# Patient Record
Sex: MALE | Race: WHITE | Employment: OTHER | URBAN - METROPOLITAN AREA
[De-identification: names, ages, dates, MRNs, and addresses within clinical notes are randomized per-mention and may not be internally consistent; named-entity substitution may affect disease eponyms.]

---

## 2024-05-01 ENCOUNTER — HOSPITAL ENCOUNTER (OUTPATIENT)
Age: 74
Setting detail: SPECIMEN
Discharge: HOME | End: 2024-05-01

## 2024-05-02 PROCEDURE — 87086 URINE CULTURE/COLONY COUNT: CPT

## 2024-05-03 ENCOUNTER — HOSPITAL ENCOUNTER (OUTPATIENT)
Age: 74
Setting detail: SPECIMEN
Discharge: HOME | End: 2024-05-03

## 2024-05-03 LAB
ALBUMIN SERPL-MCNC: 2.7 G/DL (ref 3.5–5.2)
ALP SERPL-CCNC: 93 U/L (ref 40–129)
ALT SERPL-CCNC: 15 U/L (ref 5–41)
ANION GAP SERPL CALCULATED.3IONS-SCNC: 9 MMOL/L (ref 9–17)
AST SERPL-CCNC: 16 U/L
BILIRUB DIRECT SERPL-MCNC: 0.4 MG/DL
BILIRUB INDIRECT SERPL-MCNC: 0.8 MG/DL (ref 0–1)
BILIRUB SERPL-MCNC: 1.2 MG/DL (ref 0.3–1.2)
BUN SERPL-MCNC: 10 MG/DL (ref 8–23)
BUN/CREAT SERPL: 17 (ref 9–20)
CALCIUM SERPL-MCNC: 8.6 MG/DL (ref 8.6–10.4)
CHLORIDE SERPL-SCNC: 101 MMOL/L (ref 98–107)
CO2 SERPL-SCNC: 27 MMOL/L (ref 20–31)
CREAT SERPL-MCNC: 0.6 MG/DL (ref 0.7–1.2)
GFR, ESTIMATED: >90 ML/MIN/1.73M2
GLUCOSE SERPL-MCNC: 99 MG/DL (ref 70–99)
MICROORGANISM SPEC CULT: ABNORMAL
PLATELET # BLD AUTO: 214 K/UL (ref 138–453)
POTASSIUM SERPL-SCNC: 3.9 MMOL/L (ref 3.7–5.3)
PROT SERPL-MCNC: 6 G/DL (ref 6.4–8.3)
SERVICE CMNT-IMP: ABNORMAL
SODIUM SERPL-SCNC: 137 MMOL/L (ref 135–144)
SPECIMEN DESCRIPTION: ABNORMAL
WBC OTHER # BLD: 10.6 K/UL (ref 3.5–11.3)

## 2024-05-03 PROCEDURE — P9603 ONE-WAY ALLOW PRORATED MILES: HCPCS

## 2024-05-03 PROCEDURE — 36415 COLL VENOUS BLD VENIPUNCTURE: CPT

## 2024-05-03 PROCEDURE — 80076 HEPATIC FUNCTION PANEL: CPT

## 2024-05-03 PROCEDURE — 85048 AUTOMATED LEUKOCYTE COUNT: CPT

## 2024-05-03 PROCEDURE — 80048 BASIC METABOLIC PNL TOTAL CA: CPT

## 2024-05-03 PROCEDURE — 85049 AUTOMATED PLATELET COUNT: CPT

## 2024-05-04 ENCOUNTER — HOSPITAL ENCOUNTER (INPATIENT)
Age: 74
LOS: 4 days | Discharge: HOME HEALTH | DRG: 291 | End: 2024-05-08
Attending: EMERGENCY MEDICINE | Admitting: HOSPITALIST
Payer: MEDICARE

## 2024-05-04 ENCOUNTER — APPOINTMENT (OUTPATIENT)
Dept: GENERAL RADIOLOGY | Facility: CLINIC | Age: 74
DRG: 291 | End: 2024-05-04
Attending: EMERGENCY MEDICINE
Payer: MEDICARE

## 2024-05-04 DIAGNOSIS — I47.29 NONSUSTAINED VENTRICULAR TACHYCARDIA (HCC): Primary | ICD-10-CM

## 2024-05-04 DIAGNOSIS — I50.9 CONGESTIVE HEART FAILURE, UNSPECIFIED HF CHRONICITY, UNSPECIFIED HEART FAILURE TYPE (HCC): ICD-10-CM

## 2024-05-04 DIAGNOSIS — R06.02 SHORTNESS OF BREATH: ICD-10-CM

## 2024-05-04 DIAGNOSIS — R09.02 HYPOXIA: ICD-10-CM

## 2024-05-04 PROBLEM — I47.20 VT (VENTRICULAR TACHYCARDIA) (HCC): Status: ACTIVE | Noted: 2024-05-04

## 2024-05-04 PROBLEM — I47.20 VENTRICULAR TACHYARRHYTHMIA (HCC): Status: ACTIVE | Noted: 2024-05-04

## 2024-05-04 LAB
ALBUMIN SERPL-MCNC: 3.2 G/DL (ref 3.5–5.2)
ALP SERPL-CCNC: 98 U/L (ref 40–129)
ALT SERPL-CCNC: 15 U/L (ref 5–41)
AST SERPL-CCNC: 17 U/L
BASOPHILS # BLD: 0 K/UL (ref 0–0.2)
BASOPHILS NFR BLD: 0 % (ref 0–2)
BILIRUB DIRECT SERPL-MCNC: 0.5 MG/DL
BILIRUB INDIRECT SERPL-MCNC: 1.1 MG/DL (ref 0–1)
BILIRUB SERPL-MCNC: 1.6 MG/DL (ref 0.3–1.2)
BNP SERPL-MCNC: 1843 PG/ML
BUN BLD-MCNC: 11 MG/DL (ref 8–26)
CA-I BLD-SCNC: 1.13 MMOL/L (ref 1.15–1.33)
CHLORIDE BLD-SCNC: 101 MMOL/L (ref 98–107)
CO2 BLD CALC-SCNC: 25 MMOL/L (ref 22–30)
CRITICAL ACTION: NORMAL
CRITICAL NOTIFICATION DATE/TIME: NORMAL
CRITICAL NOTIFICATION: NORMAL
CRITICAL VALUE READ BACK: YES
EGFR, POC: >90 ML/MIN/1.73M2
EOSINOPHIL # BLD: 0.12 K/UL (ref 0–0.4)
EOSINOPHILS RELATIVE PERCENT: 1 % (ref 1–4)
ERYTHROCYTE [DISTWIDTH] IN BLOOD BY AUTOMATED COUNT: 16.9 % (ref 12.5–15.4)
FIO2: 3.5
GLUCOSE BLD-MCNC: 126 MG/DL (ref 74–100)
HCO3 VENOUS: 27.4 MMOL/L (ref 22–29)
HCT VFR BLD AUTO: 41.7 % (ref 41–53)
HGB BLD-MCNC: 14 G/DL (ref 13.5–17.5)
LACTATE BLDV-SCNC: 1.3 MMOL/L (ref 0.5–2.2)
LYMPHOCYTES NFR BLD: 0.6 K/UL (ref 1–4.8)
LYMPHOCYTES RELATIVE PERCENT: 5 % (ref 24–44)
MAGNESIUM SERPL-MCNC: 1.7 MG/DL (ref 1.6–2.6)
MCH RBC QN AUTO: 29.3 PG (ref 26–34)
MCHC RBC AUTO-ENTMCNC: 33.7 G/DL (ref 31–37)
MCV RBC AUTO: 86.9 FL (ref 80–100)
MONOCYTES NFR BLD: 0.36 K/UL (ref 0.1–0.8)
MONOCYTES NFR BLD: 3 % (ref 1–7)
MORPHOLOGY: ABNORMAL
NEUTROPHILS NFR BLD: 91 % (ref 36–66)
NEUTS SEG NFR BLD: 10.82 K/UL (ref 1.8–7.7)
O2 DELIVERY DEVICE: ABNORMAL
O2 SAT, VEN: 54.7 % (ref 60–85)
PATIENT TEMP: 37
PCO2, VEN: 43.1 MM HG (ref 41–51)
PH VENOUS: 7.41 (ref 7.32–7.43)
PLATELET # BLD AUTO: 247 K/UL (ref 140–450)
PMV BLD AUTO: 7.4 FL (ref 6–12)
PO2, VEN: 28.7 MM HG (ref 30–50)
POC ANION GAP: 10 MMOL/L (ref 7–16)
POC CREATININE: 0.6 MG/DL (ref 0.51–1.19)
POC HCO3: 26.8 MMOL/L (ref 21–28)
POC LACTIC ACID: 1.1 MMOL/L (ref 0.56–1.39)
POC O2 SATURATION: 92.3 % (ref 94–98)
POC PCO2: 38.7 MM HG (ref 35–48)
POC PH: 7.45 (ref 7.35–7.45)
POC PO2: 61.8 MM HG (ref 83–108)
POSITIVE BASE EXCESS, ART: 2.6 MMOL/L (ref 0–3)
POSITIVE BASE EXCESS, VEN: 2.2 MMOL/L (ref 0–3)
POTASSIUM BLD-SCNC: 4.6 MMOL/L (ref 3.5–4.5)
PROT SERPL-MCNC: 6.6 G/DL (ref 6.4–8.3)
RBC # BLD AUTO: 4.79 M/UL (ref 4.5–5.9)
SODIUM BLD-SCNC: 135 MMOL/L (ref 138–146)
TROPONIN I SERPL HS-MCNC: 60 NG/L (ref 0–22)
TROPONIN I SERPL HS-MCNC: 67 NG/L (ref 0–22)
TROPONIN I SERPL HS-MCNC: 67 NG/L (ref 0–22)
WBC OTHER # BLD: 11.9 K/UL (ref 3.5–11)

## 2024-05-04 PROCEDURE — 82330 ASSAY OF CALCIUM: CPT

## 2024-05-04 PROCEDURE — 82947 ASSAY GLUCOSE BLOOD QUANT: CPT

## 2024-05-04 PROCEDURE — 2580000003 HC RX 258: Performed by: INTERNAL MEDICINE

## 2024-05-04 PROCEDURE — 94640 AIRWAY INHALATION TREATMENT: CPT

## 2024-05-04 PROCEDURE — 6370000000 HC RX 637 (ALT 250 FOR IP): Performed by: HOSPITALIST

## 2024-05-04 PROCEDURE — 96366 THER/PROPH/DIAG IV INF ADDON: CPT

## 2024-05-04 PROCEDURE — 36415 COLL VENOUS BLD VENIPUNCTURE: CPT

## 2024-05-04 PROCEDURE — 83880 ASSAY OF NATRIURETIC PEPTIDE: CPT

## 2024-05-04 PROCEDURE — 82565 ASSAY OF CREATININE: CPT

## 2024-05-04 PROCEDURE — 6360000002 HC RX W HCPCS: Performed by: EMERGENCY MEDICINE

## 2024-05-04 PROCEDURE — 6360000002 HC RX W HCPCS: Performed by: HOSPITALIST

## 2024-05-04 PROCEDURE — 85025 COMPLETE CBC W/AUTO DIFF WBC: CPT

## 2024-05-04 PROCEDURE — 96365 THER/PROPH/DIAG IV INF INIT: CPT

## 2024-05-04 PROCEDURE — 0202U NFCT DS 22 TRGT SARS-COV-2: CPT

## 2024-05-04 PROCEDURE — 2580000003 HC RX 258: Performed by: HOSPITALIST

## 2024-05-04 PROCEDURE — 71045 X-RAY EXAM CHEST 1 VIEW: CPT

## 2024-05-04 PROCEDURE — 80051 ELECTROLYTE PANEL: CPT

## 2024-05-04 PROCEDURE — 87040 BLOOD CULTURE FOR BACTERIA: CPT

## 2024-05-04 PROCEDURE — 84520 ASSAY OF UREA NITROGEN: CPT

## 2024-05-04 PROCEDURE — 80076 HEPATIC FUNCTION PANEL: CPT

## 2024-05-04 PROCEDURE — 84484 ASSAY OF TROPONIN QUANT: CPT

## 2024-05-04 PROCEDURE — 83605 ASSAY OF LACTIC ACID: CPT

## 2024-05-04 PROCEDURE — 96374 THER/PROPH/DIAG INJ IV PUSH: CPT

## 2024-05-04 PROCEDURE — 93005 ELECTROCARDIOGRAM TRACING: CPT | Performed by: EMERGENCY MEDICINE

## 2024-05-04 PROCEDURE — 2580000003 HC RX 258: Performed by: EMERGENCY MEDICINE

## 2024-05-04 PROCEDURE — 99285 EMERGENCY DEPT VISIT HI MDM: CPT

## 2024-05-04 PROCEDURE — 82803 BLOOD GASES ANY COMBINATION: CPT

## 2024-05-04 PROCEDURE — 94761 N-INVAS EAR/PLS OXIMETRY MLT: CPT

## 2024-05-04 PROCEDURE — 2700000000 HC OXYGEN THERAPY PER DAY

## 2024-05-04 PROCEDURE — 6360000002 HC RX W HCPCS: Performed by: INTERNAL MEDICINE

## 2024-05-04 PROCEDURE — 2000000000 HC ICU R&B

## 2024-05-04 PROCEDURE — 83735 ASSAY OF MAGNESIUM: CPT

## 2024-05-04 PROCEDURE — 36600 WITHDRAWAL OF ARTERIAL BLOOD: CPT

## 2024-05-04 RX ORDER — AMIODARONE HYDROCHLORIDE 150 MG/3ML
INJECTION, SOLUTION INTRAVENOUS
Status: DISPENSED
Start: 2024-05-04 | End: 2024-05-05

## 2024-05-04 RX ORDER — ONDANSETRON 2 MG/ML
4 INJECTION INTRAMUSCULAR; INTRAVENOUS EVERY 6 HOURS PRN
Status: DISCONTINUED | OUTPATIENT
Start: 2024-05-04 | End: 2024-05-08 | Stop reason: HOSPADM

## 2024-05-04 RX ORDER — SODIUM CHLORIDE FOR INHALATION 0.9 %
3 VIAL, NEBULIZER (ML) INHALATION EVERY 8 HOURS PRN
Status: DISCONTINUED | OUTPATIENT
Start: 2024-05-04 | End: 2024-05-06

## 2024-05-04 RX ORDER — FUROSEMIDE 10 MG/ML
40 INJECTION INTRAMUSCULAR; INTRAVENOUS DAILY
Status: DISCONTINUED | OUTPATIENT
Start: 2024-05-04 | End: 2024-05-08 | Stop reason: HOSPADM

## 2024-05-04 RX ORDER — NITROGLYCERIN 0.4 MG/1
0.4 TABLET SUBLINGUAL EVERY 5 MIN PRN
COMMUNITY

## 2024-05-04 RX ORDER — ALBUTEROL SULFATE 2.5 MG/3ML
2.5 SOLUTION RESPIRATORY (INHALATION)
Status: DISCONTINUED | OUTPATIENT
Start: 2024-05-04 | End: 2024-05-04

## 2024-05-04 RX ORDER — DOCUSATE SODIUM 100 MG/1
100 CAPSULE, LIQUID FILLED ORAL DAILY
COMMUNITY

## 2024-05-04 RX ORDER — TIOTROPIUM BROMIDE 18 UG/1
18 CAPSULE ORAL; RESPIRATORY (INHALATION) DAILY
COMMUNITY

## 2024-05-04 RX ORDER — SODIUM CHLORIDE 9 MG/ML
INJECTION, SOLUTION INTRAVENOUS PRN
Status: DISCONTINUED | OUTPATIENT
Start: 2024-05-04 | End: 2024-05-08 | Stop reason: HOSPADM

## 2024-05-04 RX ORDER — CLOPIDOGREL BISULFATE 75 MG/1
75 TABLET ORAL DAILY
COMMUNITY

## 2024-05-04 RX ORDER — ONDANSETRON 4 MG/1
4 TABLET, ORALLY DISINTEGRATING ORAL EVERY 8 HOURS PRN
Status: DISCONTINUED | OUTPATIENT
Start: 2024-05-04 | End: 2024-05-08 | Stop reason: HOSPADM

## 2024-05-04 RX ORDER — ACETAMINOPHEN 650 MG/1
650 SUPPOSITORY RECTAL EVERY 6 HOURS PRN
Status: DISCONTINUED | OUTPATIENT
Start: 2024-05-04 | End: 2024-05-08 | Stop reason: HOSPADM

## 2024-05-04 RX ORDER — MAGNESIUM SULFATE 1 G/100ML
1000 INJECTION INTRAVENOUS PRN
Status: DISCONTINUED | OUTPATIENT
Start: 2024-05-04 | End: 2024-05-08 | Stop reason: HOSPADM

## 2024-05-04 RX ORDER — ACETAMINOPHEN 325 MG/1
650 TABLET ORAL EVERY 6 HOURS PRN
Status: DISCONTINUED | OUTPATIENT
Start: 2024-05-04 | End: 2024-05-08 | Stop reason: HOSPADM

## 2024-05-04 RX ORDER — IPRATROPIUM BROMIDE AND ALBUTEROL SULFATE 2.5; .5 MG/3ML; MG/3ML
1 SOLUTION RESPIRATORY (INHALATION)
Status: DISCONTINUED | OUTPATIENT
Start: 2024-05-04 | End: 2024-05-04

## 2024-05-04 RX ORDER — POTASSIUM CHLORIDE 7.45 MG/ML
10 INJECTION INTRAVENOUS PRN
Status: DISCONTINUED | OUTPATIENT
Start: 2024-05-04 | End: 2024-05-08 | Stop reason: HOSPADM

## 2024-05-04 RX ORDER — POTASSIUM CHLORIDE 20 MEQ/1
40 TABLET, EXTENDED RELEASE ORAL PRN
Status: DISCONTINUED | OUTPATIENT
Start: 2024-05-04 | End: 2024-05-08 | Stop reason: HOSPADM

## 2024-05-04 RX ORDER — BISACODYL 10 MG
10 SUPPOSITORY, RECTAL RECTAL DAILY PRN
COMMUNITY

## 2024-05-04 RX ORDER — SODIUM CHLORIDE 0.9 % (FLUSH) 0.9 %
10 SYRINGE (ML) INJECTION EVERY 12 HOURS SCHEDULED
Status: DISCONTINUED | OUTPATIENT
Start: 2024-05-04 | End: 2024-05-08 | Stop reason: HOSPADM

## 2024-05-04 RX ORDER — SODIUM CHLORIDE 0.9 % (FLUSH) 0.9 %
10 SYRINGE (ML) INJECTION PRN
Status: DISCONTINUED | OUTPATIENT
Start: 2024-05-04 | End: 2024-05-08 | Stop reason: HOSPADM

## 2024-05-04 RX ORDER — LEVALBUTEROL 1.25 MG/.5ML
1.25 SOLUTION, CONCENTRATE RESPIRATORY (INHALATION) EVERY 8 HOURS PRN
Status: DISCONTINUED | OUTPATIENT
Start: 2024-05-04 | End: 2024-05-04

## 2024-05-04 RX ORDER — SENNOSIDES A AND B 8.6 MG/1
1 TABLET, FILM COATED ORAL 2 TIMES DAILY PRN
Status: DISCONTINUED | OUTPATIENT
Start: 2024-05-04 | End: 2024-05-08 | Stop reason: HOSPADM

## 2024-05-04 RX ORDER — CARVEDILOL 6.25 MG/1
6.25 TABLET ORAL 2 TIMES DAILY
Status: ON HOLD | COMMUNITY
End: 2024-05-08 | Stop reason: HOSPADM

## 2024-05-04 RX ORDER — LEVALBUTEROL 1.25 MG/.5ML
1.25 SOLUTION, CONCENTRATE RESPIRATORY (INHALATION) EVERY 4 HOURS PRN
Status: DISCONTINUED | OUTPATIENT
Start: 2024-05-04 | End: 2024-05-08 | Stop reason: HOSPADM

## 2024-05-04 RX ORDER — ALBUTEROL SULFATE 90 UG/1
2 AEROSOL, METERED RESPIRATORY (INHALATION) EVERY 6 HOURS PRN
Status: DISCONTINUED | OUTPATIENT
Start: 2024-05-04 | End: 2024-05-04

## 2024-05-04 RX ORDER — ALBUTEROL SULFATE 90 UG/1
2 AEROSOL, METERED RESPIRATORY (INHALATION) EVERY 6 HOURS PRN
COMMUNITY

## 2024-05-04 RX ADMIN — WATER 40 MG: 1 INJECTION INTRAMUSCULAR; INTRAVENOUS; SUBCUTANEOUS at 22:36

## 2024-05-04 RX ADMIN — IPRATROPIUM BROMIDE AND ALBUTEROL SULFATE 1 DOSE: .5; 2.5 SOLUTION RESPIRATORY (INHALATION) at 20:06

## 2024-05-04 RX ADMIN — WATER 1000 MG: 1 INJECTION INTRAMUSCULAR; INTRAVENOUS; SUBCUTANEOUS at 22:36

## 2024-05-04 RX ADMIN — AMIODARONE HYDROCHLORIDE 0.5 MG/MIN: 50 INJECTION, SOLUTION INTRAVENOUS at 16:20

## 2024-05-04 RX ADMIN — SODIUM CHLORIDE, PRESERVATIVE FREE 10 ML: 5 INJECTION INTRAVENOUS at 20:17

## 2024-05-04 RX ADMIN — DEXTROSE MONOHYDRATE 150 MG: 50 INJECTION, SOLUTION INTRAVENOUS at 15:48

## 2024-05-04 RX ADMIN — FUROSEMIDE 40 MG: 10 INJECTION, SOLUTION INTRAMUSCULAR; INTRAVENOUS at 20:16

## 2024-05-04 RX ADMIN — APIXABAN 5 MG: 5 TABLET, FILM COATED ORAL at 20:18

## 2024-05-04 NOTE — FLOWSHEET NOTE
Andres contacted via telephone and clinical lead requested paperwork to be faxed confirming patient is a DNR-CCA. Fax received and paperwork placed in chart. Writer to contact Dr. Small.

## 2024-05-04 NOTE — FLOWSHEET NOTE
Patient arrived to room 2029 via stretcher with [facility]. [facility] reports patient is a DNR-CCA; however we do not have paperwork stating this. RN notified clinical lead and house supervisor of this matter. Patient is alert and oriented and denies pain. Amiodarone gtt infusing at 0.5 mg/min. Patient is on 4 liters NC and denies O2 home use prior. Patient transferred to bed via 4 staff members. Bed locked and placed in lowest position. Side rails up x2. Call light placed at the patient's bedside. Bedside report completed. Vital signs obtained (see flowsheet). RN educated the patient on position on plan of care at this time. Patient verbalizes understanding. Will continue to monitor closely.

## 2024-05-04 NOTE — ED PROVIDER NOTES
[facility]  [address] [address]  Phone: [phone]  EMERGENCY DEPARTMENT ENCOUNTER      Pt Name: Aniceto Elam  MRN: [id number]  Birthdate 1950  Date of evaluation: 5/4/2024    CHIEF COMPLAINT       Chief Complaint   Patient presents with    Shortness of Breath     X 1 week. From [facility]. EMS arrived pt 88%/RA.        HISTORY OF PRESENT ILLNESS    Aniceto Elam is a 74 y.o. male who presents to the emergency department with a complaint of dyspnea.  Patient states symptoms have been ongoing for 3 weeks.  He states has been worse in the last week.  He came in via ambulance from [facility].  When EMS arrived his oxygen saturation was 88% on room air.  Patient has a history of pulmonary embolus and COPD.  He denies any chest pain.  He states he has a cough which is productive of yellow sputum.  He denies any fever, or chills.  Denies any lower extremity edema.  He has a history of atrial fibrillation and is on Eliquis.  Patient was recently discharged to [facility] from a [facility] facility for  Procedure Summary         Date: 04/13/24 Room / Location: [facility] OR 11 / TT SPECIAL PROC     Anesthesia Start: 1806 Anesthesia Stop: 2231     Procedure: ULTRASOUND GUIDED ACCESS OF LEFT COMMON FEMORAL ARTERY/ CUTDOWN OF RIGHT COMMON FEMORAL ARTERY/ ENDOVASCULAR ARTHRECTOMY OF RIGHT ILIAC ARTERY/ EVAR/ IVUS/ STENT PLACEMENT OF RIGHT ILIAC/ THROMBECTOMY/ RIGHT FEMORAL ENDARTERECTOMY WITH PATCH ANGIOPLASTY/ BALLOON ANGIOPLASTY/ ENDOANCHORS (Bilateral: Groin) Diagnosis: (SYMPTOMATIC ABCOMINAL AORTIC ANEURYSM)     Surgeons: Gulshan Roy MD      The patient states he has never been on oxygen.  He denies any abdominal pain he denies any nausea, vomiting, or diarrhea.  He denies any hematuria, dysuria.  He denies any lower extremity edema, or cramping.  He has not missed any doses of the Eliquis.  Patient is a poor historian.  He is a DNR CC Arrest.  Agreeable to cardioversion  REVIEW OF

## 2024-05-04 NOTE — FLOWSHEET NOTE
RN contacted Dr. Small via [employer] and informed of patient's arrival to 2029, as well as paperwork recieved from [facility] stating [facility] is a DNRCCA and need to place code status order, as well as need for diet order and home medications. Awaiting response at this time. Will continue to monitor closely.

## 2024-05-04 NOTE — RT PROTOCOL NOTE
RT Inhaler-Nebulizer Bronchodilator Protocol Note    There is a bronchodilator order in the chart from a provider indicating to follow the RT Bronchodilator Protocol and there is an “Initiate RT Inhaler-Nebulizer Bronchodilator Protocol” order as well (see protocol at bottom of note).    CXR Findings:  XR CHEST PORTABLE    Result Date: 5/4/2024  1. Hazy bibasilar opacities, more likely atelectatic, without consolidation. 2. Lucency left base laterally with sharp curvilinear border, possibly superimposed. No definite pneumothorax.  Suggest follow-up PA and lateral study.       The findings from the last RT Protocol Assessment were as follows:   History Pulmonary Disease: Chronic pulmonary disease  Respiratory Pattern: Mild dyspnea at rest, irregular pattern, or RR 21-25 bpm  Breath Sounds: Intermittent or unilateral wheezes  Cough: Strong, productive  Indication for Bronchodilator Therapy: Decreased or absent breath sounds, On home bronchodilators, Wheezing associated with pulm disorder  Bronchodilator Assessment Score: 11    Aerosolized bronchodilator medication orders have been revised according to the RT Inhaler-Nebulizer Bronchodilator Protocol below.    Respiratory Therapist to perform RT Therapy Protocol Assessment initially then follow the protocol.  Repeat RT Therapy Protocol Assessment PRN for score 0-3 or on second treatment, BID, and PRN for scores above 3.    No Indications - adjust the frequency to every 6 hours PRN wheezing or bronchospasm, if no treatments needed after 48 hours then discontinue using Per Protocol order mode.     If indication present, adjust the RT bronchodilator orders based on the Bronchodilator Assessment Score as indicated below.  Use Inhaler orders unless patient has one or more of the following: on home nebulizer, not able to hold breath for 10 seconds, is not alert and oriented, cannot activate and use MDI correctly, or respiratory rate 25 breaths per minute or more, then use 
inability or reluctance to perform ADLs/decreased activity level

## 2024-05-05 ENCOUNTER — APPOINTMENT (OUTPATIENT)
Dept: GENERAL RADIOLOGY | Age: 74
DRG: 291 | End: 2024-05-05
Payer: MEDICARE

## 2024-05-05 LAB
ANION GAP SERPL CALCULATED.3IONS-SCNC: 9 MMOL/L (ref 9–17)
B PARAP IS1001 DNA NPH QL NAA+NON-PROBE: NOT DETECTED
B PERT DNA SPEC QL NAA+PROBE: NOT DETECTED
BASOPHILS # BLD: 0 K/UL (ref 0–0.2)
BASOPHILS NFR BLD: 0 % (ref 0–2)
BNP SERPL-MCNC: 1766 PG/ML
BUN SERPL-MCNC: 15 MG/DL (ref 8–23)
BUN/CREAT SERPL: 21 (ref 9–20)
C PNEUM DNA NPH QL NAA+NON-PROBE: NOT DETECTED
CALCIUM SERPL-MCNC: 8.5 MG/DL (ref 8.6–10.4)
CHLORIDE SERPL-SCNC: 98 MMOL/L (ref 98–107)
CO2 SERPL-SCNC: 27 MMOL/L (ref 20–31)
CREAT SERPL-MCNC: 0.7 MG/DL (ref 0.7–1.2)
EOSINOPHIL # BLD: 0 K/UL (ref 0–0.44)
EOSINOPHILS RELATIVE PERCENT: 0 % (ref 1–4)
ERYTHROCYTE [DISTWIDTH] IN BLOOD BY AUTOMATED COUNT: 16.1 % (ref 11.8–14.4)
FLUAV RNA NPH QL NAA+NON-PROBE: NOT DETECTED
FLUBV RNA NPH QL NAA+NON-PROBE: NOT DETECTED
GFR, ESTIMATED: >90 ML/MIN/1.73M2
GLUCOSE SERPL-MCNC: 129 MG/DL (ref 70–99)
HADV DNA NPH QL NAA+NON-PROBE: NOT DETECTED
HCOV 229E RNA NPH QL NAA+NON-PROBE: NOT DETECTED
HCOV HKU1 RNA NPH QL NAA+NON-PROBE: NOT DETECTED
HCOV NL63 RNA NPH QL NAA+NON-PROBE: NOT DETECTED
HCOV OC43 RNA NPH QL NAA+NON-PROBE: NOT DETECTED
HCT VFR BLD AUTO: 42.3 % (ref 40.7–50.3)
HGB BLD-MCNC: 13.4 G/DL (ref 13–17)
HMPV RNA NPH QL NAA+NON-PROBE: NOT DETECTED
HPIV1 RNA NPH QL NAA+NON-PROBE: NOT DETECTED
HPIV2 RNA NPH QL NAA+NON-PROBE: NOT DETECTED
HPIV3 RNA NPH QL NAA+NON-PROBE: NOT DETECTED
HPIV4 RNA NPH QL NAA+NON-PROBE: NOT DETECTED
IMM GRANULOCYTES # BLD AUTO: 0.08 K/UL (ref 0–0.3)
IMM GRANULOCYTES NFR BLD: 1 %
LYMPHOCYTES NFR BLD: 0.48 K/UL (ref 1.1–3.7)
LYMPHOCYTES RELATIVE PERCENT: 6 % (ref 24–43)
M PNEUMO DNA NPH QL NAA+NON-PROBE: NOT DETECTED
MCH RBC QN AUTO: 28.6 PG (ref 25.2–33.5)
MCHC RBC AUTO-ENTMCNC: 31.7 G/DL (ref 28.4–34.8)
MCV RBC AUTO: 90.2 FL (ref 82.6–102.9)
MONOCYTES NFR BLD: 0.48 K/UL (ref 0.1–1.2)
MONOCYTES NFR BLD: 6 % (ref 3–12)
NEUTROPHILS NFR BLD: 87 % (ref 36–65)
NEUTS SEG NFR BLD: 6.96 K/UL (ref 1.5–8.1)
NRBC BLD-RTO: 0 PER 100 WBC
PLATELET # BLD AUTO: 184 K/UL (ref 138–453)
PMV BLD AUTO: 9.4 FL (ref 8.1–13.5)
POTASSIUM SERPL-SCNC: 4.3 MMOL/L (ref 3.7–5.3)
RBC # BLD AUTO: 4.69 M/UL (ref 4.21–5.77)
RSV RNA NPH QL NAA+NON-PROBE: NOT DETECTED
RV+EV RNA NPH QL NAA+NON-PROBE: NOT DETECTED
SARS-COV-2 RNA NPH QL NAA+NON-PROBE: NOT DETECTED
SODIUM SERPL-SCNC: 134 MMOL/L (ref 135–144)
SPECIMEN DESCRIPTION: NORMAL
WBC OTHER # BLD: 8 K/UL (ref 3.5–11.3)

## 2024-05-05 PROCEDURE — 6360000002 HC RX W HCPCS: Performed by: INTERNAL MEDICINE

## 2024-05-05 PROCEDURE — 2580000003 HC RX 258: Performed by: EMERGENCY MEDICINE

## 2024-05-05 PROCEDURE — 2060000000 HC ICU INTERMEDIATE R&B

## 2024-05-05 PROCEDURE — 80048 BASIC METABOLIC PNL TOTAL CA: CPT

## 2024-05-05 PROCEDURE — 2580000003 HC RX 258: Performed by: HOSPITALIST

## 2024-05-05 PROCEDURE — 94761 N-INVAS EAR/PLS OXIMETRY MLT: CPT

## 2024-05-05 PROCEDURE — 71045 X-RAY EXAM CHEST 1 VIEW: CPT

## 2024-05-05 PROCEDURE — 6360000002 HC RX W HCPCS: Performed by: HOSPITALIST

## 2024-05-05 PROCEDURE — 36415 COLL VENOUS BLD VENIPUNCTURE: CPT

## 2024-05-05 PROCEDURE — 6370000000 HC RX 637 (ALT 250 FOR IP): Performed by: INTERNAL MEDICINE

## 2024-05-05 PROCEDURE — 85025 COMPLETE CBC W/AUTO DIFF WBC: CPT

## 2024-05-05 PROCEDURE — 6360000002 HC RX W HCPCS: Performed by: EMERGENCY MEDICINE

## 2024-05-05 PROCEDURE — 83880 ASSAY OF NATRIURETIC PEPTIDE: CPT

## 2024-05-05 PROCEDURE — 2580000003 HC RX 258: Performed by: INTERNAL MEDICINE

## 2024-05-05 PROCEDURE — 97116 GAIT TRAINING THERAPY: CPT

## 2024-05-05 PROCEDURE — 97530 THERAPEUTIC ACTIVITIES: CPT

## 2024-05-05 PROCEDURE — 6370000000 HC RX 637 (ALT 250 FOR IP): Performed by: HOSPITALIST

## 2024-05-05 PROCEDURE — 2700000000 HC OXYGEN THERAPY PER DAY

## 2024-05-05 PROCEDURE — 97162 PT EVAL MOD COMPLEX 30 MIN: CPT

## 2024-05-05 RX ORDER — HYDROCODONE BITARTRATE AND ACETAMINOPHEN 5; 325 MG/1; MG/1
1 TABLET ORAL EVERY 6 HOURS PRN
Status: DISCONTINUED | OUTPATIENT
Start: 2024-05-05 | End: 2024-05-08 | Stop reason: HOSPADM

## 2024-05-05 RX ORDER — DOCUSATE SODIUM 100 MG/1
100 CAPSULE, LIQUID FILLED ORAL DAILY
Status: DISCONTINUED | OUTPATIENT
Start: 2024-05-05 | End: 2024-05-08 | Stop reason: HOSPADM

## 2024-05-05 RX ORDER — DOXYCYCLINE 100 MG/1
100 CAPSULE ORAL EVERY 12 HOURS SCHEDULED
Status: DISCONTINUED | OUTPATIENT
Start: 2024-05-05 | End: 2024-05-08 | Stop reason: HOSPADM

## 2024-05-05 RX ORDER — ATORVASTATIN CALCIUM 40 MG/1
40 TABLET, FILM COATED ORAL DAILY
Status: DISCONTINUED | OUTPATIENT
Start: 2024-05-05 | End: 2024-05-08 | Stop reason: HOSPADM

## 2024-05-05 RX ORDER — BISACODYL 10 MG
10 SUPPOSITORY, RECTAL RECTAL DAILY PRN
Status: DISCONTINUED | OUTPATIENT
Start: 2024-05-05 | End: 2024-05-08 | Stop reason: HOSPADM

## 2024-05-05 RX ORDER — CILOSTAZOL 50 MG/1
50 TABLET ORAL
Status: DISCONTINUED | OUTPATIENT
Start: 2024-05-05 | End: 2024-05-08 | Stop reason: HOSPADM

## 2024-05-05 RX ORDER — CLOPIDOGREL BISULFATE 75 MG/1
75 TABLET ORAL DAILY
Status: DISCONTINUED | OUTPATIENT
Start: 2024-05-05 | End: 2024-05-08 | Stop reason: HOSPADM

## 2024-05-05 RX ORDER — CARVEDILOL 6.25 MG/1
6.25 TABLET ORAL 2 TIMES DAILY
Status: DISCONTINUED | OUTPATIENT
Start: 2024-05-05 | End: 2024-05-06

## 2024-05-05 RX ADMIN — WATER 40 MG: 1 INJECTION INTRAMUSCULAR; INTRAVENOUS; SUBCUTANEOUS at 05:49

## 2024-05-05 RX ADMIN — WATER 40 MG: 1 INJECTION INTRAMUSCULAR; INTRAVENOUS; SUBCUTANEOUS at 21:25

## 2024-05-05 RX ADMIN — CLOPIDOGREL BISULFATE 75 MG: 75 TABLET ORAL at 15:11

## 2024-05-05 RX ADMIN — SODIUM CHLORIDE, PRESERVATIVE FREE 10 ML: 5 INJECTION INTRAVENOUS at 21:34

## 2024-05-05 RX ADMIN — APIXABAN 5 MG: 5 TABLET, FILM COATED ORAL at 09:08

## 2024-05-05 RX ADMIN — SODIUM CHLORIDE, PRESERVATIVE FREE 10 ML: 5 INJECTION INTRAVENOUS at 09:08

## 2024-05-05 RX ADMIN — APIXABAN 5 MG: 5 TABLET, FILM COATED ORAL at 21:18

## 2024-05-05 RX ADMIN — DOXYCYCLINE 100 MG: 100 CAPSULE ORAL at 21:18

## 2024-05-05 RX ADMIN — CILOSTAZOL 50 MG: 50 TABLET ORAL at 15:11

## 2024-05-05 RX ADMIN — WATER 40 MG: 1 INJECTION INTRAMUSCULAR; INTRAVENOUS; SUBCUTANEOUS at 14:27

## 2024-05-05 RX ADMIN — WATER 1000 MG: 1 INJECTION INTRAMUSCULAR; INTRAVENOUS; SUBCUTANEOUS at 21:25

## 2024-05-05 RX ADMIN — CARVEDILOL 6.25 MG: 6.25 TABLET, FILM COATED ORAL at 21:18

## 2024-05-05 RX ADMIN — FUROSEMIDE 40 MG: 10 INJECTION, SOLUTION INTRAMUSCULAR; INTRAVENOUS at 09:08

## 2024-05-05 RX ADMIN — ATORVASTATIN CALCIUM 40 MG: 40 TABLET, FILM COATED ORAL at 15:11

## 2024-05-05 RX ADMIN — AMIODARONE HYDROCHLORIDE 0.5 MG/MIN: 50 INJECTION, SOLUTION INTRAVENOUS at 01:30

## 2024-05-05 RX ADMIN — AMIODARONE HYDROCHLORIDE 0.5 MG/MIN: 50 INJECTION, SOLUTION INTRAVENOUS at 18:44

## 2024-05-05 ASSESSMENT — PAIN SCALES - GENERAL
PAINLEVEL_OUTOF10: 0

## 2024-05-05 NOTE — PLAN OF CARE
Problem: Respiratory - Adult  Goal: Able to breathe comfortably  Outcome: Progressing     Problem: Respiratory - Adult  Goal: Patient's breath sounds will be clear and equal  Outcome: Progressing     Problem: Respiratory - Adult  Goal: Adequate oxygenation  Description: Adequate oxygenation  Outcome: Progressing           BRONCHOSPASM/BRONCHOCONSTRICTION    IMPROVE  AERATION/BREATHSOUNDS  ADMINISTER BRONCHODILATOR THERAPY AS APPROPRIATE  ASSESS BREATH SOUNDS  INITIATE AEROSOL PROTOCOL IF ORDERED TO DO SO  PATIENT EDUCATION AS NEEDED      PROVIDE ADEQUATE OXYGENATION WITH ACCEPTABLE SP02/ABG'S    [x]  IDENTIFY APPROPRIATE OXYGEN THERAPY  [x]   MONITOR SP02/ABG'S AS NEEDED   [x]   PATIENT EDUCATION AS NEEDED

## 2024-05-05 NOTE — CARE COORDINATION
Case Management Assessment  Initial Evaluation    Date/Time of Evaluation: 5/5/2024 1:43 PM  Assessment Completed by: Donna Dempsey RN    If patient is discharged prior to next notation, then this note serves as note for discharge by case management.    Patient Name: Aniceto Elam                   YOB: 1950  Diagnosis: Hypoxia [R09.02]  Nonsustained ventricular tachycardia (HCC) [I47.29]  Congestive heart failure, unspecified HF chronicity, unspecified heart failure type (HCC) [I50.9]  VT (ventricular tachycardia) (HCC) [I47.20]  Ventricular tachyarrhythmia (HCC) [I47.20]                   Date / Time: 5/4/2024  2:39 PM    Patient Admission Status: Inpatient   Readmission Risk (Low < 19, Mod (19-27), High > 27): Readmission Risk Score: 10.9    Current PCP: Storm Mcmanus MD  PCP verified by CM? Yes    Chart Reviewed: Yes      History Provided by: Patient  Patient Orientation: Alert and Oriented    Patient Cognition: Alert    Hospitalization in the last 30 days (Readmission):  No    If yes, Readmission Assessment in CM Navigator will be completed.    Advance Directives:      Code Status: DNR-CCA   Patient's Primary Decision Maker is: Patient Declined (Legal Next of Kin Remains as Decision Maker)      Discharge Planning:    Patient lives with: Alone Type of Home: House  Primary Care Giver: Self  Patient Support Systems include: None   Current Financial resources: Medicare  Current community resources: ECF/Home Care ([facility])  Current services prior to admission: Skilled Nursing Facility            Current DME:              Type of Home Care services:  None    ADLS  Prior functional level: Assistance with the following:, Housework, Shopping, Mobility, Bathing  Current functional level: Assistance with the following:, Housework, Shopping, Mobility, Bathing    PT AM-PAC: 17 /24  OT AM-PAC:   /24    Family can provide assistance at DC: No  Would you like Case Management to discuss the

## 2024-05-05 NOTE — PLAN OF CARE
Problem: Discharge Planning  Goal: Discharge to home or other facility with appropriate resources  Outcome: Progressing  Flowsheets (Taken 5/4/2024 2000)  Discharge to home or other facility with appropriate resources:   Identify barriers to discharge with patient and caregiver   Arrange for needed discharge resources and transportation as appropriate   Identify discharge learning needs (meds, wound care, etc)   Arrange for interpreters to assist at discharge as needed     Problem: Safety - Adult  Goal: Free from fall injury  Outcome: Progressing     Problem: Respiratory - Adult  Goal: Able to breathe comfortably  5/4/2024 2010 by Dea Flores RCP  Outcome: Progressing  Goal: Patient's breath sounds will be clear and equal  5/4/2024 2010 by Dea Flores RCP  Outcome: Progressing  Goal: Adequate oxygenation  Description: Adequate oxygenation  5/4/2024 2010 by Dea Flores RCP  Outcome: Progressing     Problem: Skin/Tissue Integrity  Goal: Absence of new skin breakdown  Description: 1.  Monitor for areas of redness and/or skin breakdown  2.  Assess vascular access sites hourly  3.  Every 4-6 hours minimum:  Change oxygen saturation probe site  4.  Every 4-6 hours:  If on nasal continuous positive airway pressure, respiratory therapy assess nares and determine need for appliance change or resting period.  Outcome: Progressing     Problem: Pain  Goal: Verbalizes/displays adequate comfort level or baseline comfort level  Outcome: Progressing

## 2024-05-05 NOTE — H&P
History & Physical  [facility]    Adult Hospitalist      Name: Aniceto Elam  MRN: [id number]     Acct: [id number]  Room: 2029/2029-01    Admit Date: 5/4/2024  2:39 PM  PCP: Storm Mcmanus MD    Primary Problem  Principal Problem:    VT (ventricular tachycardia) (HCC)  Active Problems:    Ventricular tachyarrhythmia (HCC)  Resolved Problems:    * No resolved hospital problems. *        Assesment/ plan:     Patient admitted to ICU        Nonsustained ventricular tachycardia  Patient has history of paroxysmal A-fib  He is on Eliquis  Patient was started on IV amiodarone  Monitor heart rate  Cardiology consult      Acute on chronic systolic CHF  BNP was elevated  IV Lasix  Monitor intake and output  Monitor respiratory status      Cardiomyopathy  Patient has history of apical aneurysm and EF of 40 to 45% on echocardiogram from March 2024  On cilostazol and Plavix          Acute hypoxic respiratory insufficiency  Patient was requiring 2 to 3 L O2 on presentation  O2 to maintain oxygen saturation greater than 92%  Critical care/pulmonology was consulted      UTI  Empiric IV ceftriaxone  Follow urine culture      Chronic COPD  Monitor respiratory status        Tobacco use  Counseled on cessation      Peripheral vascular disease  Stable        Continue to monitor/telemetry/CBC with differential daily/BMP daily  DVT and GI prophylaxis.  Continue medications as below      Scheduled Meds:   cilostazol  50 mg Oral BID AC    clopidogrel  75 mg Oral Daily    atorvastatin  40 mg Oral Daily    carvedilol  6.25 mg Oral BID    docusate sodium  100 mg Oral Daily    apixaban  5 mg Oral BID    furosemide  40 mg IntraVENous Daily    sodium chloride flush  10 mL IntraVENous 2 times per day    cefTRIAXone (ROCEPHIN) IV  1,000 mg IntraVENous Q24H    methylPREDNISolone  40 mg IntraVENous Q8H     Continuous Infusions:   amiodarone 0.5 mg/min (05/05/24 0130)    sodium chloride       PRN Meds:  bisacodyl, 10 mg, Daily

## 2024-05-05 NOTE — PLAN OF CARE
Problem: Discharge Planning  Goal: Discharge to home or other facility with appropriate resources  5/5/2024 1035 by Yasmine Olguin RN  Outcome: Progressing  Flowsheets  Taken 5/5/2024 0927  Discharge to home or other facility with appropriate resources: Identify barriers to discharge with patient and caregiver  Taken 5/5/2024 0800  Discharge to home or other facility with appropriate resources: Identify barriers to discharge with patient and caregiver     Problem: Safety - Adult  Goal: Free from fall injury  5/5/2024 1035 by Yasmine Olguin RN  Outcome: Progressing     Problem: Respiratory - Adult  Goal: Achieves optimal ventilation and oxygenation  Outcome: Progressing     Problem: Skin/Tissue Integrity  Goal: Absence of new skin breakdown  Description: 1.  Monitor for areas of redness and/or skin breakdown  2.  Assess vascular access sites hourly  3.  Every 4-6 hours minimum:  Change oxygen saturation probe site  4.  Every 4-6 hours:  If on nasal continuous positive airway pressure, respiratory therapy assess nares and determine need for appliance change or resting period.  5/5/2024 1035 by Yasmine Olguin RN  Outcome: Progressing     Problem: Pain  Goal: Verbalizes/displays adequate comfort level or baseline comfort level  5/5/2024 1035 by Yasmine Olguin RN  Outcome: Progressing     Problem: ABCDS Injury Assessment  Goal: Absence of physical injury  5/5/2024 1035 by Yasmine Olguin RN  Outcome: Progressing     Problem: Cardiovascular - Adult  Goal: Maintains optimal cardiac output and hemodynamic stability  Outcome: Progressing     Problem: Cardiovascular - Adult  Goal: Absence of cardiac dysrhythmias or at baseline  Outcome: Progressing

## 2024-05-06 ENCOUNTER — APPOINTMENT (OUTPATIENT)
Age: 74
DRG: 291 | End: 2024-05-06
Attending: HOSPITALIST
Payer: MEDICARE

## 2024-05-06 LAB
AMORPH SED URNS QL MICRO: ABNORMAL
ANION GAP SERPL CALCULATED.3IONS-SCNC: 12 MMOL/L (ref 9–17)
BACTERIA URNS QL MICRO: ABNORMAL
BASOPHILS # BLD: 0 K/UL (ref 0–0.2)
BASOPHILS NFR BLD: 0 %
BILIRUB UR QL STRIP: NEGATIVE
BNP SERPL-MCNC: 1296 PG/ML
BUN SERPL-MCNC: 21 MG/DL (ref 8–23)
BUN/CREAT SERPL: 35 (ref 9–20)
CALCIUM SERPL-MCNC: 8.7 MG/DL (ref 8.6–10.4)
CASTS #/AREA URNS LPF: ABNORMAL /LPF
CHLORIDE SERPL-SCNC: 96 MMOL/L (ref 98–107)
CLARITY UR: CLEAR
CO2 SERPL-SCNC: 27 MMOL/L (ref 20–31)
COLOR UR: YELLOW
CREAT SERPL-MCNC: 0.6 MG/DL (ref 0.7–1.2)
EKG ATRIAL RATE: 87 BPM
EKG Q-T INTERVAL: 320 MS
EKG QRS DURATION: 84 MS
EKG QTC CALCULATION (BAZETT): 402 MS
EKG R AXIS: 64 DEGREES
EKG T AXIS: -92 DEGREES
EKG VENTRICULAR RATE: 95 BPM
EOSINOPHIL # BLD: 0 K/UL (ref 0–0.4)
EOSINOPHILS RELATIVE PERCENT: 0 % (ref 1–4)
EPI CELLS #/AREA URNS HPF: ABNORMAL /HPF (ref 0–5)
ERYTHROCYTE [DISTWIDTH] IN BLOOD BY AUTOMATED COUNT: 15.7 % (ref 11.8–14.4)
GFR, ESTIMATED: >90 ML/MIN/1.73M2
GLUCOSE SERPL-MCNC: 151 MG/DL (ref 70–99)
GLUCOSE UR STRIP-MCNC: NEGATIVE MG/DL
HCT VFR BLD AUTO: 42 % (ref 40.7–50.3)
HGB BLD-MCNC: 13.5 G/DL (ref 13–17)
HGB UR QL STRIP.AUTO: NEGATIVE
IMM GRANULOCYTES # BLD AUTO: 0.09 K/UL (ref 0–0.3)
IMM GRANULOCYTES NFR BLD: 1 %
KETONES UR STRIP-MCNC: NEGATIVE MG/DL
LEUKOCYTE ESTERASE UR QL STRIP: NEGATIVE
LYMPHOCYTES NFR BLD: 0.53 K/UL (ref 1–4.8)
LYMPHOCYTES RELATIVE PERCENT: 6 % (ref 24–44)
MCH RBC QN AUTO: 29 PG (ref 25.2–33.5)
MCHC RBC AUTO-ENTMCNC: 32.1 G/DL (ref 28.4–34.8)
MCV RBC AUTO: 90.3 FL (ref 82.6–102.9)
MONOCYTES NFR BLD: 0.79 K/UL (ref 0.2–0.8)
MONOCYTES NFR BLD: 9 % (ref 1–7)
MUCOUS THREADS URNS QL MICRO: ABNORMAL
NEUTROPHILS NFR BLD: 84 % (ref 36–66)
NEUTS SEG NFR BLD: 7.39 K/UL (ref 1.8–7.7)
NITRITE UR QL STRIP: NEGATIVE
NRBC BLD-RTO: 0 PER 100 WBC
PH UR STRIP: 5 [PH] (ref 5–8)
PLATELET # BLD AUTO: 185 K/UL (ref 138–453)
PMV BLD AUTO: 9.8 FL (ref 8.1–13.5)
POTASSIUM SERPL-SCNC: 4 MMOL/L (ref 3.7–5.3)
PROT UR STRIP-MCNC: ABNORMAL MG/DL
RBC # BLD AUTO: 4.65 M/UL (ref 4.21–5.77)
RBC #/AREA URNS HPF: ABNORMAL /HPF (ref 0–2)
SODIUM SERPL-SCNC: 135 MMOL/L (ref 135–144)
SP GR UR STRIP: 1.02 (ref 1–1.03)
UROBILINOGEN UR STRIP-ACNC: ABNORMAL EU/DL (ref 0–1)
WBC #/AREA URNS HPF: ABNORMAL /HPF (ref 0–5)
WBC OTHER # BLD: 8.8 K/UL (ref 3.5–11.3)

## 2024-05-06 PROCEDURE — 85025 COMPLETE CBC W/AUTO DIFF WBC: CPT

## 2024-05-06 PROCEDURE — 83880 ASSAY OF NATRIURETIC PEPTIDE: CPT

## 2024-05-06 PROCEDURE — 6370000000 HC RX 637 (ALT 250 FOR IP): Performed by: HOSPITALIST

## 2024-05-06 PROCEDURE — 6370000000 HC RX 637 (ALT 250 FOR IP): Performed by: INTERNAL MEDICINE

## 2024-05-06 PROCEDURE — 6370000000 HC RX 637 (ALT 250 FOR IP): Performed by: STUDENT IN AN ORGANIZED HEALTH CARE EDUCATION/TRAINING PROGRAM

## 2024-05-06 PROCEDURE — 2060000000 HC ICU INTERMEDIATE R&B

## 2024-05-06 PROCEDURE — 2580000003 HC RX 258: Performed by: INTERNAL MEDICINE

## 2024-05-06 PROCEDURE — 94761 N-INVAS EAR/PLS OXIMETRY MLT: CPT

## 2024-05-06 PROCEDURE — 2580000003 HC RX 258: Performed by: HOSPITALIST

## 2024-05-06 PROCEDURE — 6360000002 HC RX W HCPCS: Performed by: INTERNAL MEDICINE

## 2024-05-06 PROCEDURE — 97166 OT EVAL MOD COMPLEX 45 MIN: CPT

## 2024-05-06 PROCEDURE — 6360000002 HC RX W HCPCS: Performed by: HOSPITALIST

## 2024-05-06 PROCEDURE — 80048 BASIC METABOLIC PNL TOTAL CA: CPT

## 2024-05-06 PROCEDURE — 36415 COLL VENOUS BLD VENIPUNCTURE: CPT

## 2024-05-06 PROCEDURE — 2580000003 HC RX 258: Performed by: EMERGENCY MEDICINE

## 2024-05-06 PROCEDURE — 6360000002 HC RX W HCPCS: Performed by: EMERGENCY MEDICINE

## 2024-05-06 PROCEDURE — 81001 URINALYSIS AUTO W/SCOPE: CPT

## 2024-05-06 PROCEDURE — 2700000000 HC OXYGEN THERAPY PER DAY

## 2024-05-06 PROCEDURE — 97535 SELF CARE MNGMENT TRAINING: CPT

## 2024-05-06 RX ORDER — SODIUM CHLORIDE FOR INHALATION 0.9 %
3 VIAL, NEBULIZER (ML) INHALATION EVERY 4 HOURS PRN
Status: DISCONTINUED | OUTPATIENT
Start: 2024-05-06 | End: 2024-05-08 | Stop reason: HOSPADM

## 2024-05-06 RX ORDER — CARVEDILOL 12.5 MG/1
12.5 TABLET ORAL 2 TIMES DAILY
Status: DISCONTINUED | OUTPATIENT
Start: 2024-05-06 | End: 2024-05-08 | Stop reason: HOSPADM

## 2024-05-06 RX ADMIN — DOCUSATE SODIUM 100 MG: 100 CAPSULE, LIQUID FILLED ORAL at 09:11

## 2024-05-06 RX ADMIN — WATER 40 MG: 1 INJECTION INTRAMUSCULAR; INTRAVENOUS; SUBCUTANEOUS at 21:10

## 2024-05-06 RX ADMIN — FUROSEMIDE 40 MG: 10 INJECTION, SOLUTION INTRAMUSCULAR; INTRAVENOUS at 09:11

## 2024-05-06 RX ADMIN — DOXYCYCLINE 100 MG: 100 CAPSULE ORAL at 21:09

## 2024-05-06 RX ADMIN — CARVEDILOL 12.5 MG: 12.5 TABLET, FILM COATED ORAL at 21:09

## 2024-05-06 RX ADMIN — CARVEDILOL 6.25 MG: 6.25 TABLET, FILM COATED ORAL at 09:10

## 2024-05-06 RX ADMIN — CLOPIDOGREL BISULFATE 75 MG: 75 TABLET ORAL at 09:11

## 2024-05-06 RX ADMIN — APIXABAN 5 MG: 5 TABLET, FILM COATED ORAL at 21:09

## 2024-05-06 RX ADMIN — WATER 40 MG: 1 INJECTION INTRAMUSCULAR; INTRAVENOUS; SUBCUTANEOUS at 15:56

## 2024-05-06 RX ADMIN — DOXYCYCLINE 100 MG: 100 CAPSULE ORAL at 09:11

## 2024-05-06 RX ADMIN — CILOSTAZOL 50 MG: 50 TABLET ORAL at 06:14

## 2024-05-06 RX ADMIN — ATORVASTATIN CALCIUM 40 MG: 40 TABLET, FILM COATED ORAL at 09:11

## 2024-05-06 RX ADMIN — WATER 40 MG: 1 INJECTION INTRAMUSCULAR; INTRAVENOUS; SUBCUTANEOUS at 06:12

## 2024-05-06 RX ADMIN — SODIUM CHLORIDE, PRESERVATIVE FREE 10 ML: 5 INJECTION INTRAVENOUS at 09:12

## 2024-05-06 RX ADMIN — WATER 1000 MG: 1 INJECTION INTRAMUSCULAR; INTRAVENOUS; SUBCUTANEOUS at 21:10

## 2024-05-06 RX ADMIN — AMIODARONE HYDROCHLORIDE 0.5 MG/MIN: 50 INJECTION, SOLUTION INTRAVENOUS at 10:02

## 2024-05-06 RX ADMIN — CILOSTAZOL 50 MG: 50 TABLET ORAL at 15:56

## 2024-05-06 RX ADMIN — APIXABAN 5 MG: 5 TABLET, FILM COATED ORAL at 09:11

## 2024-05-06 ASSESSMENT — PAIN SCALES - GENERAL
PAINLEVEL_OUTOF10: 0
PAINLEVEL_OUTOF10: 0

## 2024-05-06 NOTE — PLAN OF CARE
Problem: Discharge Planning  Goal: Discharge to home or other facility with appropriate resources  Outcome: Progressing  Flowsheets (Taken 5/5/2024 2000)  Discharge to home or other facility with appropriate resources:   Identify barriers to discharge with patient and caregiver   Arrange for needed discharge resources and transportation as appropriate   Identify discharge learning needs (meds, wound care, etc)     Problem: Safety - Adult  Goal: Free from fall injury  Outcome: Progressing     Problem: Respiratory - Adult  Goal: Achieves optimal ventilation and oxygenation  Outcome: Progressing  Flowsheets (Taken 5/5/2024 2000)  Achieves optimal ventilation and oxygenation:   Assess for changes in respiratory status   Assess for changes in mentation and behavior   Position to facilitate oxygenation and minimize respiratory effort   Oxygen supplementation based on oxygen saturation or arterial blood gases     Problem: Skin/Tissue Integrity  Goal: Absence of new skin breakdown  Description: 1.  Monitor for areas of redness and/or skin breakdown  2.  Assess vascular access sites hourly  3.  Every 4-6 hours minimum:  Change oxygen saturation probe site  4.  Every 4-6 hours:  If on nasal continuous positive airway pressure, respiratory therapy assess nares and determine need for appliance change or resting period.  Outcome: Progressing     Problem: Pain  Goal: Verbalizes/displays adequate comfort level or baseline comfort level  Outcome: Progressing  Flowsheets  Taken 5/6/2024 0000  Verbalizes/displays adequate comfort level or baseline comfort level:   Encourage patient to monitor pain and request assistance   Assess pain using appropriate pain scale  Taken 5/5/2024 2000  Verbalizes/displays adequate comfort level or baseline comfort level:   Encourage patient to monitor pain and request assistance   Assess pain using appropriate pain scale   Administer analgesics based on type and severity of pain and evaluate

## 2024-05-06 NOTE — PLAN OF CARE
Problem: Discharge Planning  Goal: Discharge to home or other facility with appropriate resources  Outcome: Progressing     Problem: Safety - Adult  Goal: Free from fall injury  Outcome: Progressing     Problem: Respiratory - Adult  Goal: Achieves optimal ventilation and oxygenation  Outcome: Progressing     Problem: Skin/Tissue Integrity  Goal: Absence of new skin breakdown  Description: 1.  Monitor for areas of redness and/or skin breakdown  2.  Assess vascular access sites hourly  3.  Every 4-6 hours minimum:  Change oxygen saturation probe site  4.  Every 4-6 hours:  If on nasal continuous positive airway pressure, respiratory therapy assess nares and determine need for appliance change or resting period.  Outcome: Progressing     Problem: Pain  Goal: Verbalizes/displays adequate comfort level or baseline comfort level  Outcome: Progressing     Problem: ABCDS Injury Assessment  Goal: Absence of physical injury  Outcome: Progressing     Problem: Cardiovascular - Adult  Goal: Maintains optimal cardiac output and hemodynamic stability  Outcome: Progressing  Goal: Absence of cardiac dysrhythmias or at baseline  Outcome: Progressing

## 2024-05-06 NOTE — CARE COORDINATION
Social work:  Spoke to Aparna at [facility], they stated patient has used all his skilled rehab days and did not want pay privately to stay therefore they cannot accept him back.   Met with patient at bedside to discuss, he is aware of this and is opting to go home.  But he will need his house keys which are at [facility].   SW left  for facility admissions to see if someone from their building could bring his belongings to the hospital; await response.    Update 14:47- Spoke to [facility], someone from their facility will bring pt's belongings to the hospital.  SW also rec'd call from Yue Rasmussen/ROBERT, she will come to the hospital to meet with patient.

## 2024-05-07 LAB
ANION GAP SERPL CALCULATED.3IONS-SCNC: 12 MMOL/L (ref 9–17)
BASOPHILS # BLD: 0 K/UL (ref 0–0.2)
BASOPHILS NFR BLD: 0 % (ref 0–2)
BNP SERPL-MCNC: 857 PG/ML
BUN SERPL-MCNC: 26 MG/DL (ref 8–23)
BUN/CREAT SERPL: 37 (ref 9–20)
CALCIUM SERPL-MCNC: 8.7 MG/DL (ref 8.6–10.4)
CHLORIDE SERPL-SCNC: 93 MMOL/L (ref 98–107)
CO2 SERPL-SCNC: 29 MMOL/L (ref 20–31)
CREAT SERPL-MCNC: 0.7 MG/DL (ref 0.7–1.2)
EOSINOPHIL # BLD: 0 K/UL (ref 0–0.44)
EOSINOPHILS RELATIVE PERCENT: 0 % (ref 1–4)
ERYTHROCYTE [DISTWIDTH] IN BLOOD BY AUTOMATED COUNT: 15.5 % (ref 11.8–14.4)
GFR, ESTIMATED: >90 ML/MIN/1.73M2
GLUCOSE SERPL-MCNC: 236 MG/DL (ref 70–99)
HCT VFR BLD AUTO: 40.8 % (ref 40.7–50.3)
HGB BLD-MCNC: 13.2 G/DL (ref 13–17)
IMM GRANULOCYTES # BLD AUTO: 0.12 K/UL (ref 0–0.3)
IMM GRANULOCYTES NFR BLD: 1 %
LYMPHOCYTES NFR BLD: 0.74 K/UL (ref 1.1–3.7)
LYMPHOCYTES RELATIVE PERCENT: 6 % (ref 24–43)
MCH RBC QN AUTO: 29.5 PG (ref 25.2–33.5)
MCHC RBC AUTO-ENTMCNC: 32.4 G/DL (ref 28.4–34.8)
MCV RBC AUTO: 91.3 FL (ref 82.6–102.9)
MONOCYTES NFR BLD: 0.49 K/UL (ref 0.1–1.2)
MONOCYTES NFR BLD: 4 % (ref 3–12)
NEUTROPHILS NFR BLD: 89 % (ref 36–65)
NEUTS SEG NFR BLD: 10.95 K/UL (ref 1.5–8.1)
NRBC BLD-RTO: 0 PER 100 WBC
PLATELET # BLD AUTO: 188 K/UL (ref 138–453)
PMV BLD AUTO: 9.9 FL (ref 8.1–13.5)
RBC # BLD AUTO: 4.47 M/UL (ref 4.21–5.77)
SODIUM SERPL-SCNC: 134 MMOL/L (ref 135–144)
WBC OTHER # BLD: 12.3 K/UL (ref 3.5–11.3)

## 2024-05-07 PROCEDURE — 2580000003 HC RX 258: Performed by: INTERNAL MEDICINE

## 2024-05-07 PROCEDURE — 94760 N-INVAS EAR/PLS OXIMETRY 1: CPT

## 2024-05-07 PROCEDURE — 6360000002 HC RX W HCPCS: Performed by: HOSPITALIST

## 2024-05-07 PROCEDURE — 85025 COMPLETE CBC W/AUTO DIFF WBC: CPT

## 2024-05-07 PROCEDURE — 6360000002 HC RX W HCPCS: Performed by: INTERNAL MEDICINE

## 2024-05-07 PROCEDURE — 36415 COLL VENOUS BLD VENIPUNCTURE: CPT

## 2024-05-07 PROCEDURE — 6370000000 HC RX 637 (ALT 250 FOR IP): Performed by: INTERNAL MEDICINE

## 2024-05-07 PROCEDURE — 97110 THERAPEUTIC EXERCISES: CPT

## 2024-05-07 PROCEDURE — 97530 THERAPEUTIC ACTIVITIES: CPT

## 2024-05-07 PROCEDURE — 6370000000 HC RX 637 (ALT 250 FOR IP): Performed by: STUDENT IN AN ORGANIZED HEALTH CARE EDUCATION/TRAINING PROGRAM

## 2024-05-07 PROCEDURE — 80048 BASIC METABOLIC PNL TOTAL CA: CPT

## 2024-05-07 PROCEDURE — 6360000002 HC RX W HCPCS: Performed by: EMERGENCY MEDICINE

## 2024-05-07 PROCEDURE — 2700000000 HC OXYGEN THERAPY PER DAY

## 2024-05-07 PROCEDURE — 2580000003 HC RX 258: Performed by: EMERGENCY MEDICINE

## 2024-05-07 PROCEDURE — 6370000000 HC RX 637 (ALT 250 FOR IP): Performed by: HOSPITALIST

## 2024-05-07 PROCEDURE — 2060000000 HC ICU INTERMEDIATE R&B

## 2024-05-07 PROCEDURE — 83880 ASSAY OF NATRIURETIC PEPTIDE: CPT

## 2024-05-07 PROCEDURE — 2580000003 HC RX 258: Performed by: HOSPITALIST

## 2024-05-07 RX ORDER — SPIRONOLACTONE 25 MG/1
25 TABLET ORAL DAILY
Status: DISCONTINUED | OUTPATIENT
Start: 2024-05-07 | End: 2024-05-08 | Stop reason: HOSPADM

## 2024-05-07 RX ADMIN — FUROSEMIDE 40 MG: 10 INJECTION, SOLUTION INTRAMUSCULAR; INTRAVENOUS at 08:06

## 2024-05-07 RX ADMIN — WATER 40 MG: 1 INJECTION INTRAMUSCULAR; INTRAVENOUS; SUBCUTANEOUS at 14:43

## 2024-05-07 RX ADMIN — ATORVASTATIN CALCIUM 40 MG: 40 TABLET, FILM COATED ORAL at 08:06

## 2024-05-07 RX ADMIN — SENNOSIDES 8.6 MG: 8.6 TABLET, FILM COATED ORAL at 15:08

## 2024-05-07 RX ADMIN — CILOSTAZOL 50 MG: 50 TABLET ORAL at 15:08

## 2024-05-07 RX ADMIN — DOXYCYCLINE 100 MG: 100 CAPSULE ORAL at 08:05

## 2024-05-07 RX ADMIN — SODIUM CHLORIDE, PRESERVATIVE FREE 10 ML: 5 INJECTION INTRAVENOUS at 08:07

## 2024-05-07 RX ADMIN — CLOPIDOGREL BISULFATE 75 MG: 75 TABLET ORAL at 08:05

## 2024-05-07 RX ADMIN — CARVEDILOL 12.5 MG: 12.5 TABLET, FILM COATED ORAL at 08:05

## 2024-05-07 RX ADMIN — WATER 40 MG: 1 INJECTION INTRAMUSCULAR; INTRAVENOUS; SUBCUTANEOUS at 05:07

## 2024-05-07 RX ADMIN — SPIRONOLACTONE 25 MG: 25 TABLET ORAL at 09:30

## 2024-05-07 RX ADMIN — DOCUSATE SODIUM 100 MG: 100 CAPSULE, LIQUID FILLED ORAL at 08:06

## 2024-05-07 RX ADMIN — CARVEDILOL 12.5 MG: 12.5 TABLET, FILM COATED ORAL at 21:02

## 2024-05-07 RX ADMIN — APIXABAN 5 MG: 5 TABLET, FILM COATED ORAL at 21:01

## 2024-05-07 RX ADMIN — DOXYCYCLINE 100 MG: 100 CAPSULE ORAL at 21:02

## 2024-05-07 RX ADMIN — APIXABAN 5 MG: 5 TABLET, FILM COATED ORAL at 08:05

## 2024-05-07 RX ADMIN — AMIODARONE HYDROCHLORIDE 0.5 MG/MIN: 50 INJECTION, SOLUTION INTRAVENOUS at 02:25

## 2024-05-07 RX ADMIN — WATER 40 MG: 1 INJECTION INTRAMUSCULAR; INTRAVENOUS; SUBCUTANEOUS at 21:03

## 2024-05-07 RX ADMIN — WATER 1000 MG: 1 INJECTION INTRAMUSCULAR; INTRAVENOUS; SUBCUTANEOUS at 21:02

## 2024-05-07 RX ADMIN — SODIUM CHLORIDE, PRESERVATIVE FREE 10 ML: 5 INJECTION INTRAVENOUS at 21:09

## 2024-05-07 RX ADMIN — CILOSTAZOL 50 MG: 50 TABLET ORAL at 05:07

## 2024-05-07 ASSESSMENT — PAIN SCALES - GENERAL: PAINLEVEL_OUTOF10: 0

## 2024-05-07 NOTE — CONSULTS
Reason for Consult: Shortness of breath, ventricular arrhythmia  Requesting Physician: Kay Small MD    CHIEF COMPLAINT: Shortness of breath    History Obtained From:  patient, electronic medical record    HISTORY OF PRESENT ILLNESS:      The patient is a 74 y.o. male with significant past medical history of coronary artery disease, dyslipidemia, peripheral vascular disease, chronic atrial fibrillation, abdominal aortic aneurysm, COPD, DVT and prior pulmonary embolism, abdominal aortic aneurysm, prior history of MI and remote coronary stenting as well as recent peripheral bypass surgery with right femoral endarterectomy right iliac atherectomy and stenting.  The patient was in the nursing home recovering from his recent peripheral bypass surgery and noted to be having progressive shortness of breath that has been getting worse over the previous few weeks.  Oxygen saturation in the ambulance was 88% on room air.  Patient received IV diuretics.  Some ventricular arrhythmia were reported and I was told 5 beats was the longest and I could not find any evidence of any significant ventricular tachycardia.  Some ventricular couplets and PVCs were seen.  Patient denies chest pain.  He states breathing has improved since his hospital admission.  No syncope or near syncope.  IV amiodarone was initiated yesterday.  His heart rate is now under control.  Patient does not history of stroke has no palpitation denies syncope.  He is chronically on Eliquis for atrial fibrillation.  Patient has chronic dyspnea on exertion that has been getting worse and he also has known LV systolic dysfunction.  No recent MI.  He has lost weight.  In the emergency room the ER note reported multiple short runs of nonsustained ventricular tachycardia.  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  Past Medical History: 
    ELECTROPHYSIOLOGY CONSULT    Reason for Consult:  Nonsustained Ventricular tachycardia  Requesting Physician: Kyra Tomas MD    CHIEF COMPLAINT:  \"I had my aorta and arteries opened up\"    History Obtained From:  patient    HISTORY OF PRESENT ILLNESS:      The patient is a 74 y.o. male with significant past medical history of coronary disease with prior PCI, ischemic cardiomyopathy EF 40 to 45% with known apical aneurysm, peripheral arterial disease with recent right iliac/femoral arthrectomy and stent placement on 4/13/2024, longstanding persistent atrial fibrillation.    Patient was discharged to rehab from [facility].  At rehab was sent to the emergency department for increased work of breathing and hypoxia.  Has been receiving IV diuretics.  Cardiology consulted due to nonsustained ventricular tachycardia for which she was started on IV amiodarone.    Overall patient denies heart racing, palpitations.  He has no history of presyncope or syncope.  Surgical site pain is improving.  Says his breathing has improved since admission.      Telemetry since admission shows predominantly rate controlled atrial fibrillation with occasional RVR, overall heart rates less than 110 bpm.  There are occasional PVCs and couplets.  1 episode of nonsustained VT of 6 beats on 5/5/2024.    Past Medical History:    Past Medical History:   Diagnosis Date    AAA (abdominal aortic aneurysm) ([facility])     COPD (chronic obstructive pulmonary disease) ([facility])     DVT (deep venous thrombosis) ([facility])     MI, old     PVD (peripheral vascular disease) ([facility])      Past Surgical History:    Past Surgical History:   Procedure Laterality Date    CHOLECYSTECTOMY      COLECTOMY      CORONARY ANGIOPLASTY WITH STENT PLACEMENT      1997 &  resolute integrity OM1 -2012    TONSILLECTOMY       Home Medications:  Prior to Admission medications    Medication Sig Start Date End Date Taking? Authorizing Provider   Apixaban (ELIQUIS PO) Take 5 mg by mouth 
Number of children: Not on file    Years of education: Not on file    Highest education level: Not on file   Occupational History    Not on file   Tobacco Use    Smoking status: Every Day     Types: Cigarettes    Smokeless tobacco: Not on file   Substance and Sexual Activity    Alcohol use: Not on file    Drug use: Not on file    Sexual activity: Not on file   Other Topics Concern    Not on file   Social History Narrative    Not on file     Social Determinants of Health     Financial Resource Strain: Not on file   Food Insecurity: No Food Insecurity (5/5/2024)    Hunger Vital Sign     Worried About Running Out of Food in the Last Year: Never true     Ran Out of Food in the Last Year: Never true   Transportation Needs: No Transportation Needs (5/5/2024)    PRAPARE - Transportation     Lack of Transportation (Medical): No     Lack of Transportation (Non-Medical): No   Physical Activity: Not on file   Stress: Not on file   Social Connections: Not on file   Intimate Partner Violence: Not on file   Housing Stability: Low Risk  (5/5/2024)    Housing Stability Vital Sign     Unable to Pay for Housing in the Last Year: No     Number of Places Lived in the Last Year: 1     Unstable Housing in the Last Year: No     Family History          Problem Relation Age of Onset    Diabetes Mother     Cancer Father     Stroke Brother     Coronary Art Dis Brother      ROS - 11 systems   General Denies any fever or chills  HEENT Denies any diplopia, tinnitus or vertigo  Resp   As above  Cardiac Denies any chest pain, palpitations, claudication or edema  GI Denies any melena, hematochezia, hematemesis or pyrosis   Denies any frequency, urgency, hesitancy or incontinence  Heme Denies bruising or bleeding easily  Endocrine Denies any history of diabetes or thyroid disease  Neuro Denies any focal motor  deficits  Psychiatric Denies anxiety, depression, suicidal ideation  Skin Denies rashes, itching, open sores    Vitals     height is 1.905

## 2024-05-07 NOTE — FLOWSHEET NOTE
05/07/24 0934   Output (mL)   Urine 650 mL  (urinal emptied)   Urine Assessment   Urinary Status Voiding   Urinary Incontinence Absent   Urine Color Malaika   Urine Appearance Clear   Urine Odor No odor

## 2024-05-07 NOTE — PLAN OF CARE
Problem: Discharge Planning  Goal: Discharge to home or other facility with appropriate resources  Outcome: Progressing  Flowsheets (Taken 5/7/2024 0800)  Discharge to home or other facility with appropriate resources: Identify barriers to discharge with patient and caregiver     Problem: Safety - Adult  Goal: Free from fall injury  Outcome: Progressing  Flowsheets (Taken 5/7/2024 1504)  Free From Fall Injury: Instruct family/caregiver on patient safety     Problem: Respiratory - Adult  Goal: Achieves optimal ventilation and oxygenation  Outcome: Progressing  Flowsheets (Taken 5/7/2024 0800)  Achieves optimal ventilation and oxygenation: Assess for changes in respiratory status     Problem: Skin/Tissue Integrity  Goal: Absence of new skin breakdown  Description: 1.  Monitor for areas of redness and/or skin breakdown  2.  Assess vascular access sites hourly  3.  Every 4-6 hours minimum:  Change oxygen saturation probe site  4.  Every 4-6 hours:  If on nasal continuous positive airway pressure, respiratory therapy assess nares and determine need for appliance change or resting period.  Outcome: Progressing     Problem: Pain  Goal: Verbalizes/displays adequate comfort level or baseline comfort level  Outcome: Progressing     Problem: ABCDS Injury Assessment  Goal: Absence of physical injury  Outcome: Progressing     Problem: Cardiovascular - Adult  Goal: Maintains optimal cardiac output and hemodynamic stability  Outcome: Progressing  Flowsheets (Taken 5/7/2024 0800)  Maintains optimal cardiac output and hemodynamic stability: Monitor blood pressure and heart rate  Goal: Absence of cardiac dysrhythmias or at baseline  Outcome: Progressing  Flowsheets (Taken 5/7/2024 0800)  Absence of cardiac dysrhythmias or at baseline: Monitor cardiac rate and rhythm     Problem: Nutrition Deficit:  Goal: Optimize nutritional status  Outcome: Progressing

## 2024-05-07 NOTE — DISCHARGE INSTR - COC
Continuity of Care Form    Patient Name: Aniceto Elam   :  1950  MRN:  [id number]    Admit date:  2024  Discharge date:  2024    Code Status Order: DNR-CCA   Advance Directives:     Admitting Physician:  Kay Small MD  PCP: Storm Mcmanus MD    Discharging Nurse: Jemal ARIZMENDI  Discharging Hospital Unit/Room#:   Discharging Unit Phone Number: [phone]    Emergency Contact:   Extended Emergency Contact Information  Primary Emergency Contact: No One,Specified  Address: [place] ONE   [place]  Home Phone: [phone]  Relation: None    Past Surgical History:  Past Surgical History:   Procedure Laterality Date    CHOLECYSTECTOMY      COLECTOMY      CORONARY ANGIOPLASTY WITH STENT PLACEMENT       &  resolute integrity OM -    TONSILLECTOMY         Immunization History:     There is no immunization history on file for this patient.    Active Problems:  Patient Active Problem List   Diagnosis Code    Severe claudication ([facility]) I73.9    Peripheral vascular disease of lower extremity with ulceration ([facility]) I73.9, L97.909    VT (ventricular tachycardia) ([facility]) I47.20    Ventricular tachyarrhythmia ([facility]) I47.20       Isolation/Infection:   Isolation            No Isolation          Patient Infection Status       None to display                     Nurse Assessment:  Last Vital Signs: BP (!) 142/82   Pulse 74   Temp 97.2 °F (36.2 °C)   Resp 16   Ht 1.905 m (6' 3\")   Wt 84.5 kg (186 lb 4.8 oz)   SpO2 98%   BMI 23.29 kg/m²     Last documented pain score (0-10 scale): Pain Level: 0  Last Weight:   Wt Readings from Last 1 Encounters:   24 84.5 kg (186 lb 4.8 oz)     Mental Status:  oriented    IV Access:  - None    Nursing Mobility/ADLs:  Walking   Assisted  Transfer  Assisted  Bathing  Assisted  Dressing  Independent  Toileting  Independent  Feeding  Independent  Med Admin  Independent  Med Delivery   whole    Wound Care Documentation and

## 2024-05-07 NOTE — PLAN OF CARE
Problem: Discharge Planning  Goal: Discharge to home or other facility with appropriate resources  Outcome: Progressing  Discharge to home or other facility with appropriate resources:   Identify barriers to discharge with patient and caregiver   Arrange for needed discharge resources and transportation as appropriate   Identify discharge learning needs (meds, wound care, etc)      Problem: Safety - Adult  Goal: Free from fall injury  Outcome: Progressing  Free From Fall Injury:   Instruct family/caregiver on patient safety   Based on caregiver fall risk screen, instruct family/caregiver to ask for assistance with transferring infant if caregiver noted to have fall risk factors      Problem: Respiratory - Adult  Goal: Achieves optimal ventilation and oxygenation  Outcome: Progressing  Achieves optimal ventilation and oxygenation:   Assess for changes in respiratory status   Assess for changes in mentation and behavior   Position to facilitate oxygenation and minimize respiratory effort   Oxygen supplementation based on oxygen saturation or arterial blood gases         Problem: Pain  Goal: Verbalizes/displays adequate comfort level or baseline comfort level  Outcome: Progressing  Verbalizes/displays adequate comfort level or baseline comfort level:   Encourage patient to monitor pain and request assistance   Assess pain using appropriate pain scale   Administer analgesics based on type and severity of pain and evaluate response

## 2024-05-07 NOTE — CARE COORDINATION
Discharge planning    Out of SNF medicare days. Patient declines SNF and wants home. Plan is home with [facility]. Watch for home O2. Needs RW order as well. Plan home tomorrow. Will need transportation.

## 2024-05-08 VITALS
OXYGEN SATURATION: 97 % | SYSTOLIC BLOOD PRESSURE: 135 MMHG | BODY MASS INDEX: 23.16 KG/M2 | RESPIRATION RATE: 20 BRPM | DIASTOLIC BLOOD PRESSURE: 90 MMHG | TEMPERATURE: 97.1 F | WEIGHT: 186.3 LBS | HEIGHT: 75 IN | HEART RATE: 84 BPM

## 2024-05-08 LAB
ANION GAP SERPL CALCULATED.3IONS-SCNC: 8 MMOL/L (ref 9–17)
BASOPHILS # BLD: <0.03 K/UL (ref 0–0.2)
BASOPHILS NFR BLD: 0 % (ref 0–2)
BNP SERPL-MCNC: 1006 PG/ML
BUN SERPL-MCNC: 21 MG/DL (ref 8–23)
BUN/CREAT SERPL: 30 (ref 9–20)
CALCIUM SERPL-MCNC: 8.7 MG/DL (ref 8.6–10.4)
CHLORIDE SERPL-SCNC: 95 MMOL/L (ref 98–107)
CO2 SERPL-SCNC: 33 MMOL/L (ref 20–31)
CREAT SERPL-MCNC: 0.7 MG/DL (ref 0.7–1.2)
EOSINOPHIL # BLD: <0.03 K/UL (ref 0–0.44)
EOSINOPHILS RELATIVE PERCENT: 0 % (ref 1–4)
ERYTHROCYTE [DISTWIDTH] IN BLOOD BY AUTOMATED COUNT: 15.5 % (ref 11.8–14.4)
GFR, ESTIMATED: >90 ML/MIN/1.73M2
GLUCOSE SERPL-MCNC: 119 MG/DL (ref 70–99)
HCT VFR BLD AUTO: 43.8 % (ref 40.7–50.3)
HGB BLD-MCNC: 14.1 G/DL (ref 13–17)
IMM GRANULOCYTES # BLD AUTO: 0.09 K/UL (ref 0–0.3)
IMM GRANULOCYTES NFR BLD: 1 %
LYMPHOCYTES NFR BLD: 0.76 K/UL (ref 1.1–3.7)
LYMPHOCYTES RELATIVE PERCENT: 7 % (ref 24–43)
MCH RBC QN AUTO: 29 PG (ref 25.2–33.5)
MCHC RBC AUTO-ENTMCNC: 32.2 G/DL (ref 28.4–34.8)
MCV RBC AUTO: 90.1 FL (ref 82.6–102.9)
MONOCYTES NFR BLD: 0.86 K/UL (ref 0.1–1.2)
MONOCYTES NFR BLD: 8 % (ref 3–12)
NEUTROPHILS NFR BLD: 84 % (ref 36–65)
NEUTS SEG NFR BLD: 8.81 K/UL (ref 1.5–8.1)
NRBC BLD-RTO: 0 PER 100 WBC
PLATELET # BLD AUTO: 205 K/UL (ref 138–453)
PMV BLD AUTO: 9.5 FL (ref 8.1–13.5)
POTASSIUM SERPL-SCNC: 4.3 MMOL/L (ref 3.7–5.3)
RBC # BLD AUTO: 4.86 M/UL (ref 4.21–5.77)
RBC # BLD: ABNORMAL 10*6/UL
SODIUM SERPL-SCNC: 136 MMOL/L (ref 135–144)
WBC OTHER # BLD: 10.5 K/UL (ref 3.5–11.3)

## 2024-05-08 PROCEDURE — 2580000003 HC RX 258: Performed by: INTERNAL MEDICINE

## 2024-05-08 PROCEDURE — 2580000003 HC RX 258: Performed by: HOSPITALIST

## 2024-05-08 PROCEDURE — 6370000000 HC RX 637 (ALT 250 FOR IP): Performed by: STUDENT IN AN ORGANIZED HEALTH CARE EDUCATION/TRAINING PROGRAM

## 2024-05-08 PROCEDURE — 2580000003 HC RX 258: Performed by: NURSE PRACTITIONER

## 2024-05-08 PROCEDURE — 6370000000 HC RX 637 (ALT 250 FOR IP): Performed by: HOSPITALIST

## 2024-05-08 PROCEDURE — 97116 GAIT TRAINING THERAPY: CPT

## 2024-05-08 PROCEDURE — 85025 COMPLETE CBC W/AUTO DIFF WBC: CPT

## 2024-05-08 PROCEDURE — 6370000000 HC RX 637 (ALT 250 FOR IP): Performed by: INTERNAL MEDICINE

## 2024-05-08 PROCEDURE — 97530 THERAPEUTIC ACTIVITIES: CPT

## 2024-05-08 PROCEDURE — 6360000002 HC RX W HCPCS: Performed by: NURSE PRACTITIONER

## 2024-05-08 PROCEDURE — 6360000002 HC RX W HCPCS: Performed by: INTERNAL MEDICINE

## 2024-05-08 PROCEDURE — 97110 THERAPEUTIC EXERCISES: CPT

## 2024-05-08 PROCEDURE — 80048 BASIC METABOLIC PNL TOTAL CA: CPT

## 2024-05-08 PROCEDURE — 83880 ASSAY OF NATRIURETIC PEPTIDE: CPT

## 2024-05-08 PROCEDURE — 6360000002 HC RX W HCPCS: Performed by: HOSPITALIST

## 2024-05-08 PROCEDURE — 36415 COLL VENOUS BLD VENIPUNCTURE: CPT

## 2024-05-08 RX ORDER — DOXYCYCLINE 100 MG/1
100 CAPSULE ORAL EVERY 12 HOURS SCHEDULED
Qty: 6 CAPSULE | Refills: 0 | Status: SHIPPED | OUTPATIENT
Start: 2024-05-08 | End: 2024-05-11

## 2024-05-08 RX ORDER — CARVEDILOL 12.5 MG/1
12.5 TABLET ORAL 2 TIMES DAILY
Qty: 60 TABLET | Refills: 3 | Status: SHIPPED | OUTPATIENT
Start: 2024-05-08

## 2024-05-08 RX ORDER — SPIRONOLACTONE 25 MG/1
25 TABLET ORAL DAILY
Qty: 30 TABLET | Refills: 3 | Status: SHIPPED | OUTPATIENT
Start: 2024-05-09

## 2024-05-08 RX ORDER — FUROSEMIDE 40 MG/1
40 TABLET ORAL DAILY
Qty: 30 TABLET | Refills: 1 | Status: SHIPPED | OUTPATIENT
Start: 2024-05-08

## 2024-05-08 RX ORDER — AMOXICILLIN AND CLAVULANATE POTASSIUM 875; 125 MG/1; MG/1
1 TABLET, FILM COATED ORAL 2 TIMES DAILY
Qty: 14 TABLET | Refills: 0 | Status: SHIPPED | OUTPATIENT
Start: 2024-05-08 | End: 2024-05-15

## 2024-05-08 RX ORDER — PREDNISONE 20 MG/1
20 TABLET ORAL 2 TIMES DAILY
Qty: 10 TABLET | Refills: 0 | Status: SHIPPED | OUTPATIENT
Start: 2024-05-08 | End: 2024-05-13

## 2024-05-08 RX ADMIN — CILOSTAZOL 50 MG: 50 TABLET ORAL at 16:21

## 2024-05-08 RX ADMIN — CARVEDILOL 12.5 MG: 12.5 TABLET, FILM COATED ORAL at 09:32

## 2024-05-08 RX ADMIN — CILOSTAZOL 50 MG: 50 TABLET ORAL at 09:32

## 2024-05-08 RX ADMIN — SPIRONOLACTONE 25 MG: 25 TABLET ORAL at 09:32

## 2024-05-08 RX ADMIN — CLOPIDOGREL BISULFATE 75 MG: 75 TABLET ORAL at 09:32

## 2024-05-08 RX ADMIN — WATER 40 MG: 1 INJECTION INTRAMUSCULAR; INTRAVENOUS; SUBCUTANEOUS at 04:53

## 2024-05-08 RX ADMIN — ATORVASTATIN CALCIUM 40 MG: 40 TABLET, FILM COATED ORAL at 09:32

## 2024-05-08 RX ADMIN — FUROSEMIDE 40 MG: 10 INJECTION, SOLUTION INTRAMUSCULAR; INTRAVENOUS at 09:33

## 2024-05-08 RX ADMIN — DOCUSATE SODIUM 100 MG: 100 CAPSULE, LIQUID FILLED ORAL at 09:31

## 2024-05-08 RX ADMIN — DOXYCYCLINE 100 MG: 100 CAPSULE ORAL at 09:31

## 2024-05-08 RX ADMIN — SODIUM CHLORIDE, PRESERVATIVE FREE 10 ML: 5 INJECTION INTRAVENOUS at 09:33

## 2024-05-08 RX ADMIN — APIXABAN 5 MG: 5 TABLET, FILM COATED ORAL at 09:32

## 2024-05-08 RX ADMIN — WATER 40 MG: 1 INJECTION INTRAMUSCULAR; INTRAVENOUS; SUBCUTANEOUS at 16:21

## 2024-05-08 NOTE — FLOWSHEET NOTE
05/08/24 1415   Treatment Team Notification   Reason for Communication Evaluate   Name of Team Member Notified Dr. Venegas   Treatment Team Role Consulting Provider   Method of Communication Secure Message   Response No new orders   Notification Time 1415       MD on unit.   Patient okay to D/C and follow up O/P with cardiology per Dr. Venegas.

## 2024-05-08 NOTE — CARE COORDINATION
Social work: Spoke to Norberto at [facility] , she will be bringing pt's belongings (including his house keys) to the hospital today by 5:00PM.

## 2024-05-08 NOTE — FLOWSHEET NOTE
05/08/24 1330   Treatment Team Notification   Reason for Communication Evaluate   Name of Team Member Notified Lydia Seo   Treatment Team Role Advanced Practice Nurse   Method of Communication Face to face   Response No new orders   Notification Time 1330       NP on unit.   Steroid dose changed (see orders). No objections on patient being D/C.

## 2024-05-08 NOTE — CARE COORDINATION
Discharge orders placed in Epic. CM spoke with patient and he still refuses SNF at this time and states that he will return home with [facility]. CM called and updated Gerri with [facility] that patient will discharge today. DANICA completed. [facility] him by 5pm today. [facility] delivered Rolator to bedside yesterday. TANG called and spoke with Desean from [facility] [phone] and she confirmed that they can transport patient home at 7pm today. Home oxygen orders and face to face faxed to [facility]. CM sent message to [facility] with [facility] and he confirmed that they are able to deliver concentrator to patient once he is home this evening. CM provided patient with portable HCS tank and gave instructions for him to call once home to arrange home concentrator delivery. Patient verbalizes being agreeable to plan.     Discharge Report    [facility]  Clinical Case Management Department  Written by: Zuleima Pickering RN    Patient Name: Aniceto Elam  Attending Provider: Kyra Tomas MD  Admit Date: 2024  2:39 PM  MRN: [id number]  Account: [id number]                     : 1950  Discharge Date: 24      Disposition: home w/ [facility] and [facility] for new home oxygen.    Zuleima Pickering RN

## 2024-05-08 NOTE — PLAN OF CARE
Problem: Discharge Planning  Goal: Discharge to home or other facility with appropriate resources  5/8/2024 0151 by Milagro Duron RN  Outcome: Progressing  Flowsheets (Taken 5/7/2024 2000)  Discharge to home or other facility with appropriate resources:   Identify barriers to discharge with patient and caregiver   Arrange for needed discharge resources and transportation as appropriate   Identify discharge learning needs (meds, wound care, etc)   Refer to discharge planning if patient needs post-hospital services based on physician order or complex needs related to functional status, cognitive ability or social support system     Problem: Safety - Adult  Goal: Free from fall injury  5/8/2024 0151 by Milagro Duron RN  Outcome: Progressing     Problem: Respiratory - Adult  Goal: Achieves optimal ventilation and oxygenation  5/8/2024 0151 by Milagro Duron RN  Outcome: Progressing     Problem: Skin/Tissue Integrity  Goal: Absence of new skin breakdown  Description: 1.  Monitor for areas of redness and/or skin breakdown  2.  Assess vascular access sites hourly  3.  Every 4-6 hours minimum:  Change oxygen saturation probe site  4.  Every 4-6 hours:  If on nasal continuous positive airway pressure, respiratory therapy assess nares and determine need for appliance change or resting period.  5/8/2024 0151 by Milagro Duron RN  Outcome: Progressing     Problem: Cardiovascular - Adult  Goal: Maintains optimal cardiac output and hemodynamic stability  5/8/2024 0151 by Milagro Duron RN  Outcome: Progressing     Problem: Cardiovascular - Adult  Goal: Absence of cardiac dysrhythmias or at baseline  5/8/2024 0151 by Milagro Duron RN  Outcome: Progressing     Problem: Nutrition Deficit:  Goal: Optimize nutritional status  5/8/2024 0151 by Milagro Duron RN  Outcome: Progressing

## 2024-05-08 NOTE — FLOWSHEET NOTE
05/08/24 4561   AVS Reviewed   AVS & discharge instructions reviewed with patient and/or representative? Yes   Reviewed instructions with Patient   Level of Understanding Questions answered;Teach back completed;Verbalized understanding       Patient given D/C instructions, as well as transportation. IV's removed. Writer contacted [employer] so patient could get home O2 when he returned home. Pt states no further questions at this time. Patient and all of his belongings wheeled to front hospital doors via wheelchair with transportation team.

## 2024-05-08 NOTE — PLAN OF CARE
Problem: Discharge Planning  Goal: Discharge to home or other facility with appropriate resources  5/8/2024 1448 by Jemal Enriquez RN  Outcome: Adequate for Discharge  5/8/2024 0151 by iMlagro Duron RN  Outcome: Progressing  Flowsheets (Taken 5/7/2024 2000)  Discharge to home or other facility with appropriate resources:   Identify barriers to discharge with patient and caregiver   Arrange for needed discharge resources and transportation as appropriate   Identify discharge learning needs (meds, wound care, etc)   Refer to discharge planning if patient needs post-hospital services based on physician order or complex needs related to functional status, cognitive ability or social support system     Problem: Safety - Adult  Goal: Free from fall injury  5/8/2024 1448 by Jemal Enriquez RN  Outcome: Adequate for Discharge  5/8/2024 0151 by Milagro Duron RN  Outcome: Progressing     Problem: Respiratory - Adult  Goal: Achieves optimal ventilation and oxygenation  5/8/2024 1448 by Jemal Enriquez RN  Outcome: Adequate for Discharge  5/8/2024 0151 by Milagro Duron RN  Outcome: Progressing     Problem: Skin/Tissue Integrity  Goal: Absence of new skin breakdown  Description: 1.  Monitor for areas of redness and/or skin breakdown  2.  Assess vascular access sites hourly  3.  Every 4-6 hours minimum:  Change oxygen saturation probe site  4.  Every 4-6 hours:  If on nasal continuous positive airway pressure, respiratory therapy assess nares and determine need for appliance change or resting period.  5/8/2024 1448 by Jemal Enriquez RN  Outcome: Adequate for Discharge  5/8/2024 0151 by Milagro Duron RN  Outcome: Progressing     Problem: Pain  Goal: Verbalizes/displays adequate comfort level or baseline comfort level  5/8/2024 1448 by Jemal Enriquez RN  Outcome: Adequate for Discharge  5/8/2024 0151 by Milagro Duron RN  Outcome: Progressing     Problem: ABCDS Injury Assessment  Goal: Absence of physical  Complex Repair And Double Advancement Flap Text: The defect edges were debeveled with a #15 scalpel blade.  The primary defect was closed partially with a complex linear closure.  Given the location of the remaining defect, shape of the defect and the proximity to free margins a double advancement flap was deemed most appropriate for complete closure of the defect.  Using a sterile surgical marker, an appropriate advancement flap was drawn incorporating the defect and placing the expected incisions within the relaxed skin tension lines where possible.    The area thus outlined was incised deep to adipose tissue with a #15 scalpel blade.  The skin margins were undermined to an appropriate distance in all directions utilizing iris scissors.

## 2024-05-09 LAB
MICROORGANISM SPEC CULT: NORMAL
MICROORGANISM SPEC CULT: NORMAL
SERVICE CMNT-IMP: NORMAL
SERVICE CMNT-IMP: NORMAL
SPECIMEN DESCRIPTION: NORMAL
SPECIMEN DESCRIPTION: NORMAL

## 2024-05-09 NOTE — DISCHARGE SUMMARY
DISCHARGE SUMMARY  [facility]    Adult Hospitalist      Patient ID: Aniceto Elam  MRN: [id number]     Acct:  [id number]       Patient's PCP: Storm Mcmanus MD    Admit Date: 5/4/2024     Discharge Date: 5/8/2024      Admitting Physician: Kay Small MD    Discharge Physician: Kyra Tomas MD     CONSULTANTS: Patient Care Team:  Storm Mcmanus MD as PCP - General (Internal Medicine)    PROCEDURES PERFORMED:     Active Discharge Diagnoses:    Nonsustained ventricular tachycardia  Patient has history of paroxysmal A-fib  He is on Eliquis  Patient was started on IV amiodarone  Monitor heart rate  Cardiology consult  Coreg increased to 12.5 twice daily  EP consult     Acute on chronic systolic CHF  BNP was elevated  IV Lasix  Monitor intake and output  Monitor respiratory status  Lasix switched to PO     Chronic atrial fibrillation, asymptomatic  Eliquis      Coronary artery disease, native vessel  MI in 1997  Right coronary artery stent  Left circumflex artery stent 2012     Cardiomyopathy   With apical aneurysm and EF of 40 to 45% on echocardiogram from March 2024  On cilostazol and Plavix      Acute hypoxic respiratory insufficiency.  Not on oxygen at home  Patient was requiring 2 to 3 L O2 on presentation  O2 to maintain oxygen saturation greater than 92%  Critical care/pulmonology was consulted  Solu-Medrol IV  Prednsione PO  Lungs clear  Rx for few days only since doing very well now  O2 2L NC  Abx PO      UTI  Empiric IV ceftriaxone  Follow urine culture      Chronic obstructive pulmonary disease, unspecified   Monitor respiratory status      Tobacco use  Counseled on cessation      Peripheral vascular disease  Stable     Dyslipidemia  Simvastatin      Primary Problem  VT (ventricular tachycardia) (HCC)    Hospital Course: Aniceto Elam is a 74 y.o.  male who presents with Shortness of Breath (X 1 week. From [facility]. EMS arrived pt 88%/RA. )     74-year-old gentleman

## 2024-05-09 NOTE — PROGRESS NOTES
End Of Shift Note-Progressive    Summary of Shift:  Pt had a restless night due to being unable to sleep. Amio drip continues to run at 0.5 with HR in the 70s-80s. Output in his urinal was 300mL. Pt reports pain in R leg, but refused pain meds.       Most Recent vitals: Temp 96.9 HR 74 RR 16 /65      Respiratory Status: He continues to use 3L of oxygen with O2 sats in 90s.      MAR meds given: Pt received scheduled doses of eliquis, coreg, monodox, rocephin and solumderol      Activity: Pt remained in bed for shift and was able to turn self      Cardiac Rhythm : Afib on monitor  
        [facility].,    Adult Hospitalist      Name: Aniceto Elam  MRN: [id number]     Acct: [id number]  Room: 2040/2040-01    Admit Date: 5/4/2024  2:39 PM  PCP: Storm Mcmanus MD    Primary Problem  Principal Problem:    VT (ventricular tachycardia) (HCC)  Active Problems:    Ventricular tachyarrhythmia (HCC)  Resolved Problems:    * No resolved hospital problems. *        Assesment/ plan:     Patient admitted to ICU- Progressive        Nonsustained ventricular tachycardia  Patient has history of paroxysmal A-fib  He is on Eliquis  Patient was started on IV amiodarone  Monitor heart rate  Cardiology consult  Coreg increased to 12.5 twice daily  EP consult      Acute on chronic systolic CHF  BNP was elevated  IV Lasix  Monitor intake and output  Monitor respiratory status      Chronic atrial fibrillation, asymptomatic  Eliquis      Coronary artery disease, native vessel  MI in 1997  Right coronary artery stent  Left circumflex artery stent 2012      Cardiomyopathy   With apical aneurysm and EF of 40 to 45% on echocardiogram from March 2024  On cilostazol and Plavix      Acute hypoxic respiratory insufficiency.  Not on oxygen at home  Patient was requiring 2 to 3 L O2 on presentation  O2 to maintain oxygen saturation greater than 92%  Critical care/pulmonology was consulted  Solu-Medrol IV        UTI  Empiric IV ceftriaxone  Follow urine culture      Chronic obstructive pulmonary disease, unspecified   Monitor respiratory status        Tobacco use  Counseled on cessation      Peripheral vascular disease  Stable    Dyslipidemia  Simvastatin      Face to face walker w wheels and seat   Pt has multiple medical issues. He will need a walker w wheels and seat to meet ADLs at home like going to the restroom, kitchen, bedroom. He gets dyspneic quickly and now has O2 NC which he did not before and would need a seat to rest and a carrier for oxygen. If he does not get it, he will have vital organs like lungs, heart 
        [facility].,    Adult Hospitalist      Name: Aniceto Elam  MRN: [id number]     Acct: [id number]  Room: 2040/2040-01    Admit Date: 5/4/2024  2:39 PM  PCP: Storm Mcmanus MD    Primary Problem  Principal Problem:    VT (ventricular tachycardia) (HCC)  Active Problems:    Ventricular tachyarrhythmia (HCC)  Resolved Problems:    * No resolved hospital problems. *        Assesment/ plan:     Patient admitted to ICU- Progressive        Nonsustained ventricular tachycardia  Patient has history of paroxysmal A-fib  He is on Eliquis  Patient was started on IV amiodarone  Monitor heart rate  Cardiology consult  Coreg increased to 12.5 twice daily  EP consult      Acute on chronic systolic CHF  BNP was elevated  IV Lasix  Monitor intake and output  Monitor respiratory status  Lasix switched to PO      Chronic atrial fibrillation, asymptomatic  Eliquis      Coronary artery disease, native vessel  MI in 1997  Right coronary artery stent  Left circumflex artery stent 2012      Cardiomyopathy   With apical aneurysm and EF of 40 to 45% on echocardiogram from March 2024  On cilostazol and Plavix      Acute hypoxic respiratory insufficiency.  Not on oxygen at home  Patient was requiring 2 to 3 L O2 on presentation  O2 to maintain oxygen saturation greater than 92%  Critical care/pulmonology was consulted  Solu-Medrol IV  Prednsione PO  Lungs clear  Rx for few days only since doing very well now  O2 2L NC  Abx PO      UTI  Empiric IV ceftriaxone  Follow urine culture      Chronic obstructive pulmonary disease, unspecified   Monitor respiratory status        Tobacco use  Counseled on cessation      Peripheral vascular disease  Stable    Dyslipidemia  Simvastatin      Face to face walker w wheels and seat   Pt has multiple medical issues. He will need a walker w wheels and seat to meet ADLs at home like going to the restroom, kitchen, bedroom. He gets dyspneic quickly and now has O2 NC which he did not before and would 
      Section of Cardiology  Progress Note      Date:  5/6/2024  Patient: Aniceto Elam  Admission:  5/4/2024  2:39 PM  Admit DX: Hypoxia [R09.02]  Nonsustained ventricular tachycardia (HCC) [I47.29]  Congestive heart failure, unspecified HF chronicity, unspecified heart failure type (HCC) [I50.9]  VT (ventricular tachycardia) (HCC) [I47.20]  Ventricular tachyarrhythmia (HCC) [I47.20]  Age:  74 y.o., 1950     LOS: 2 days     Reason for evaluation:   NSVT, chronic afib      SUBJECTIVE:     The patient was seen and examined. Notes and labs reviewed.  Pt has been doing well  Breathing is stable  Sitting in chair  Denies CP, SOB, palpitations  On amiodarone at 0.5  Bp slighlty elevated    OBJECTIVE:      EXAM:   Vitals:    VITALS:  BP (!) 142/92   Pulse 82   Temp 96.8 °F (36 °C) (Temporal)   Resp 25   Ht 1.905 m (6' 3\")   Wt 81.7 kg (180 lb 1.6 oz)   SpO2 97%   BMI 22.51 kg/m²   24HR INTAKE/OUTPUT:    Intake/Output Summary (Last 24 hours) at 5/6/2024 0812  Last data filed at 5/5/2024 1800  Gross per 24 hour   Intake 195.96 ml   Output 450 ml   Net -254.04 ml       CONSTITUTIONAL:  awake, alert, cooperative, no apparent distress, and appears stated age.  HEENT: Normal jugular venous pulsations, no carotid bruits.   LUNGS: Good respiratory effort On auscultation: clear to auscultation bilaterally  CARDIOVASCULAR:  Normal apical impulse, regular rate and rhythm, normal S1 and S2, no S3 or S4, and no murmur or rub noted.  ABDOMEN: Soft, nontender, nondistended. Bowel sounds present. No masses or tenderness. No bruit.  SKIN: Warm and dry.  EXTREMITIES:No lower extremity edema.     Current Inpatient Medications:   cilostazol  50 mg Oral BID AC    clopidogrel  75 mg Oral Daily    atorvastatin  40 mg Oral Daily    carvedilol  6.25 mg Oral BID    docusate sodium  100 mg Oral Daily    doxycycline monohydrate  100 mg Oral 2 times per day    apixaban  5 mg Oral BID    furosemide  40 mg IntraVENous Daily    sodium 
      Section of Cardiology  Progress Note      Date:  5/7/2024  Patient: Aniceto Elam  Admission:  5/4/2024  2:39 PM  Admit DX: Hypoxia [R09.02]  Nonsustained ventricular tachycardia (HCC) [I47.29]  Congestive heart failure, unspecified HF chronicity, unspecified heart failure type (HCC) [I50.9]  VT (ventricular tachycardia) (HCC) [I47.20]  Ventricular tachyarrhythmia (HCC) [I47.20]  Age:  74 y.o., 1950     LOS: 3 days     Reason for evaluation:   NSVT, chronic afib      SUBJECTIVE:     The patient was seen and examined. Notes and labs reviewed.  See by EP today and doing well  Off amio and stable on monitor  Denies CP, SoB, palpitations, edema  Breathing is improving    OBJECTIVE:      EXAM:   Vitals:    VITALS:  /65   Pulse 74   Temp 96.9 °F (36.1 °C) (Temporal)   Resp 16   Ht 1.905 m (6' 3\")   Wt 84.5 kg (186 lb 4.8 oz)   SpO2 98%   BMI 23.29 kg/m²   24HR INTAKE/OUTPUT:    Intake/Output Summary (Last 24 hours) at 5/7/2024 0815  Last data filed at 5/6/2024 2157  Gross per 24 hour   Intake --   Output 1800 ml   Net -1800 ml         CONSTITUTIONAL:  awake, alert, cooperative, no apparent distress, and appears stated age.  HEENT: Normal jugular venous pulsations, no carotid bruits.   LUNGS: Good respiratory effort On auscultation: clear to auscultation bilaterally  CARDIOVASCULAR:  Normal apical impulse, regular rate and rhythm, normal S1 and S2, no S3 or S4, and no murmur or rub noted.  ABDOMEN: Soft, nontender, nondistended. Bowel sounds present.   SKIN: Warm and dry.  EXTREMITIES:No lower extremity edema.     Current Inpatient Medications:   carvedilol  12.5 mg Oral BID    cilostazol  50 mg Oral BID AC    clopidogrel  75 mg Oral Daily    atorvastatin  40 mg Oral Daily    docusate sodium  100 mg Oral Daily    doxycycline monohydrate  100 mg Oral 2 times per day    apixaban  5 mg Oral BID    furosemide  40 mg IntraVENous Daily    sodium chloride flush  10 mL IntraVENous 2 times per day    
      Section of Cardiology  Progress Note      Date:  5/8/2024  Patient: Aniceto Elam  Admission:  5/4/2024  2:39 PM  Admit DX: Hypoxia [R09.02]  Nonsustained ventricular tachycardia (HCC) [I47.29]  Congestive heart failure, unspecified HF chronicity, unspecified heart failure type (HCC) [I50.9]  VT (ventricular tachycardia) (HCC) [I47.20]  Ventricular tachyarrhythmia (HCC) [I47.20]  Age:  74 y.o., 1950     LOS: 4 days     Reason for evaluation:   NSVT, chronic afib      SUBJECTIVE:     The patient was seen and examined. Notes and labs reviewed.  Pt doing well off amiodarone  BP and HR stable on current regime  Hoping to go home today and getting more frustrated  Denies CP, SOB, palpitations, edema  Likely home with home o2        OBJECTIVE:      EXAM:   Vitals:    VITALS:  /76   Pulse 69   Temp 96.9 °F (36.1 °C) (Temporal)   Resp 16   Ht 1.905 m (6' 3\")   Wt 84.5 kg (186 lb 4.8 oz)   SpO2 94%   BMI 23.29 kg/m²   24HR INTAKE/OUTPUT:    Intake/Output Summary (Last 24 hours) at 5/8/2024 0812  Last data filed at 5/8/2024 0400  Gross per 24 hour   Intake --   Output 1350 ml   Net -1350 ml         CONSTITUTIONAL:  awake, alert, cooperative, no apparent distress, and appears stated age.  HEENT: Normal jugular venous pulsations, no carotid bruits.   LUNGS: Good respiratory effort On auscultation: clear to auscultation bilaterally  CARDIOVASCULAR:  Normal apical impulse, regular rate and rhythm, normal S1 and S2, no S3 or S4, and no murmur or rub noted.  ABDOMEN: Soft, nontender, nondistended. Bowel sounds present.   SKIN: Warm and dry.  EXTREMITIES:No lower extremity edema.     Current Inpatient Medications:   spironolactone  25 mg Oral Daily    carvedilol  12.5 mg Oral BID    cilostazol  50 mg Oral BID AC    clopidogrel  75 mg Oral Daily    atorvastatin  40 mg Oral Daily    docusate sodium  100 mg Oral Daily    doxycycline monohydrate  100 mg Oral 2 times per day    apixaban  5 mg Oral BID    furosemide 
  Physician Progress Note      PATIENT:               PADDY ZAYAS  CSN #:                  [id number]  :                       1950  ADMIT DATE:       2024 2:39 PM  DISCH DATE:        2024 7:01 PM  RESPONDING  PROVIDER #:        Kyra SUAREZ MD          QUERY TEXT:    Pt admitted with nonsustained ventricular tachycardia and acute on chronic   systolic CHF.  Pt noted to also have history of hypertension, CAD, and cardiomyopathy.    If possible, please document in progress notes and discharge summary the   etiology of CHF, if able to be determined.    The medical record reflects the following:  Risk Factors: hx hypertension, CAD, cardiomyopathy  chronic atrial fib, cOPD  Clinical Indicators: Pro-BNP 1843->1766->1296; Per Cardiology consult:   Cardiomyopathy with documented evidence of apica aneurysm and estimated   ejection fraction of 40-45%n echocardiogram done in 2024  Treatment: IV Lasix, amiodarone, Lipitor, Coreg    Sarai Ash, MSN, RN, CCDS, CRCR  Clinical   .  Options provided:  -- CHF due to Hypertensive Heart Disease  -- CHF due to Hypertensive Heart Disease and CAD  -- CHF not due to Hypertension but due to CAD  -- CHF due to Hypertensive Heart Disease and cardiomyopathy  -- CHF not due to Hypertension but due to cardiomyopathy  -- CHF due to HTN, CAD, and cardiomyopathy  -- Other - I will add my own diagnosis  -- Disagree - Not applicable / Not valid  -- Disagree - Clinically unable to determine / Unknown  -- Refer to Clinical Documentation Reviewer    PROVIDER RESPONSE TEXT:    This patient has CHF due to hypertensive heart disease and CAD.    Query created by: Sarai Ash on 2024 6:10 PM      QUERY TEXT:    Patient admitted with non-sustained ventricular tachycardia.  noted to have   chronic atrial fibrillation and is maintained on Eliquis.    If possible, please document in progress notes and discharge summary if you   are evaluating 
Attempted echo 3x. PT with PT/OT, Eating, and still in chair. Will try back. Does appear pt had echo march 2024 per cardiology  
Comprehensive Nutrition Assessment    Type and Reason for Visit:  Initial, Positive Nutrition Screen    Nutrition Recommendations/Plan:   Provide diet rx as ordered   Provide supplements as ordered   Monitor labs as they become available   Monitor skin integrity/weights     Malnutrition Assessment:  Malnutrition Status:  No malnutrition (05/07/24 1348)    Context:        Findings of the 6 clinical characteristics of malnutrition:  Energy Intake:     Weight Loss:        Body Fat Loss:        Muscle Mass Loss:       Fluid Accumulation:        Strength:       Nutrition Assessment:    complete occlusion of right and left SFA and popliteal arteries.   He also was diagnosed cellulitis treated with antibiotics.He underwent    Abdominal aortic aneurysm repair EVARright femoral endarterectomy right iliac atherectomy and stenting .  He was discharge to nursing home.  He was about to released home when he was noted to have progressive worsening shortness of breath.  Had mostly dry cough.  He was found to have borderline oxygen saturation 88% by EMS evaluation. Arrived 5/4 to [facility]. Patient seen by this writer for positive nutrition screen related to weight loss and poor appetite, patient admits to poor appetite however he does drink his ensure stated by him because he knows its good for him. weight on 5/7 on 186#, weight history 5/6 was 187#, weight on 5/5 was 192# double accuracy of 5/5 weight, patient does not appear malnourished. Monitor po intakes, weights, labs, skin integrity and follow up PRN.    Nutrition Related Findings:    no edema noted, BM 5/5 active. Wound Type: Surgical Incision (femoral proximal right)       Current Nutrition Intake & Therapies:    Average Meal Intake: 26-50%  Average Supplements Intake: 51-75%  ADULT DIET; Regular  ADULT ORAL NUTRITION SUPPLEMENT; Breakfast, Lunch, Dinner; Standard High Calorie/High Protein Oral Supplement    Anthropometric Measures:  Height: 190.5 cm (6' 3\")  Ideal 
Covid 19 swab taken from right nare, labeled, placed in red dot bag, and handed off to second healthcare worker outside of room for transport to laboratory per hospital policy and procedure.  Patient tolerated procedure well.  
End Of Shift Note  [facility]  Summary of shift:  Patient had a very uneventful shift.  All vital signs stable throughout shift.  Home O2 eval complete. Plan is for patient to go home with [facility] possibly today.      Vitals:    Vitals:    05/07/24 2000 05/07/24 2102 05/08/24 0000 05/08/24 0400   BP:  (!) 115/56 115/87 132/76   Pulse: 80 88 63 69   Resp: 20  18 16   Temp: 97.5 °F (36.4 °C)  97.3 °F (36.3 °C) 96.9 °F (36.1 °C)   TempSrc: Temporal  Temporal Temporal   SpO2: 100%  94% 94%   Weight:       Height:            I&O:   Intake/Output Summary (Last 24 hours) at 5/8/2024 0546  Last data filed at 5/8/2024 0400  Gross per 24 hour   Intake --   Output 1350 ml   Net -1350 ml       Resp Status: 2L nasal canula    Ventilator Settings:     / / /     Critical Care IV infusions:   sodium chloride          LDA:   Peripheral IV 05/04/24 Left Antecubital (Active)   Number of days: 3       Incision 05/05/24 Femoral Proximal;Right (Active)   Number of days: 2          
End Of Shift Note  [facility]  Summary of shift: Pt continues on Amio gtt 0.5mg. PO meds tweaked by cardiology, Amio gtt to infuse for another 24hrs per cardiology. Good urine output. EP request consult    Vitals:    Vitals:    05/06/24 1400 05/06/24 1500 05/06/24 1600 05/06/24 1700   BP: 127/78 123/72 (!) 89/58 (!) 127/95   Pulse: 80 74 83 74   Resp: 23 29 23 18   Temp:       TempSrc:       SpO2:   95%    Weight:       Height:            I&O:   Intake/Output Summary (Last 24 hours) at 5/6/2024 1817  Last data filed at 5/6/2024 1437  Gross per 24 hour   Intake --   Output 1500 ml   Net -1500 ml       Resp Status: 2lpm NC    Ventilator Settings:     / / /     Critical Care IV infusions:   amiodarone 0.5 mg/min (05/06/24 1002)    sodium chloride          LDA:   Peripheral IV 05/04/24 Left Antecubital (Active)   Number of days: 2       Incision 05/05/24 Femoral Proximal;Right (Active)   Number of days: 0          
End Of Shift Note  [facility]  Summary of shift: Pt remains on amio drip 0.5. Pt vitals hemodynamically stable. Got pt down to 4L NC. Pt had no other complaints throughout the night.     Vitals:    Vitals:    05/06/24 0319 05/06/24 0400 05/06/24 0500 05/06/24 0600   BP:  101/66 114/73 (!) 142/92   Pulse:  84 72 82   Resp:  24 17 25   Temp: 96.8 °F (36 °C) 96.8 °F (36 °C)     TempSrc: Temporal Temporal     SpO2:  99% 100% 97%   Weight: 85 kg (187 lb 8 oz)   81.7 kg (180 lb 1.6 oz)   Height:            I&O:   Intake/Output Summary (Last 24 hours) at 5/6/2024 0640  Last data filed at 5/5/2024 1800  Gross per 24 hour   Intake 195.96 ml   Output 450 ml   Net -254.04 ml       Resp Status: 4L NC    Ventilator Settings:     / / /     Critical Care IV infusions:   amiodarone 0.5 mg/min (05/05/24 1844)    sodium chloride          LDA:   Peripheral IV 05/04/24 Left Antecubital (Active)   Number of days: 1       Incision 05/05/24 Femoral Proximal;Right (Active)   Number of days: 0         
End Of Shift Note  [facility]  Summary of shift: pt plan to go home with [facility]. Home walker order in. Home O2 eval today. Pt desat to 87 while at rest. VSS. Pt still in afib rhythm. Plan to Dc tomorrow.     Vitals:    Vitals:    05/07/24 0930 05/07/24 1134 05/07/24 1326 05/07/24 1600   BP: (!) 142/82 113/66  (!) 141/96   Pulse:  86  69   Resp:  20  16   Temp:  96.9 °F (36.1 °C)  97.1 °F (36.2 °C)   TempSrc:  Temporal  Temporal   SpO2:  97%  95%   Weight:       Height:   1.905 m (6' 3\")         I&O:   Intake/Output Summary (Last 24 hours) at 5/7/2024 1733  Last data filed at 5/7/2024 0934  Gross per 24 hour   Intake --   Output 950 ml   Net -950 ml       Resp Status: 2 L NC    Ventilator Settings:     / / /     Critical Care IV infusions:   sodium chloride          LDA:   Peripheral IV 05/04/24 Left Antecubital (Active)   Number of days: 3       Incision 05/05/24 Femoral Proximal;Right (Active)   Number of days: 1         
Home Oxygen Evaluation    Home Oxygen Evaluation completed.    Patient is on 2 liters per minute via nasal cannula.  Resting SpO2 = 94%  Resting SpO2 on room air = 87%    Testing completed at [facility]  3:42 PM  
Occupational Therapy  Facility/Department: [facility]  Rehabilitation Occupational Therapy Daily Treatment Note    Date: 24  Patient Name: Aniceto Elam       Room:   MRN: [id number]  Account: [id number]   : 1950  (74 y.o.) Gender: male      UGO Osuna reports patient is medically stable for therapy treatment this date. Chart reviewed prior to treatment and patient is agreeable for therapy.  All lines intact and patient positioned comfortably at end of treatment.  All patient needs addressed prior to ending therapy session.  '    Pt currently functioning below baseline.  Recommend daily inpatient skilled therapy at time of discharge to maximize long term outcomes and prevent re-admission. Please refer to AM-PAC score for current level of function.              Past Medical History:  has a past medical history of AAA (abdominal aortic aneurysm) ([facility]), COPD (chronic obstructive pulmonary disease) ([facility]), DVT (deep venous thrombosis) ([facility]), MI, old, and PVD (peripheral vascular disease) ([facility]).  Past Surgical History:   has a past surgical history that includes Coronary angioplasty with stent; colectomy; Cholecystectomy; and Tonsillectomy.    Restrictions  Restrictions/Precautions: Up as Tolerated, General Precautions, Fall Risk  Other position/activity restrictions: 2L O2, telemetry, IV    Subjective  Subjective: Pt resting in bed upon arrival agreeable to OT. Reported some pain in RLE but tolerable at this time and has needed pain meds recently.  Restrictions/Precautions: Up as Tolerated;General Precautions;Fall Risk             Objective     Cognition  Overall Cognitive Status: Exceptions  Arousal/Alertness: Appropriate responses to stimuli  Following Commands: Follows one step commands consistently  Attention Span: Attends with cues to redirect  Memory: Decreased recall of recent events  Safety Judgement: Decreased awareness of need for assistance;Decreased awareness of need for safety  Problem 
Patient was evaluated today for the diagnosis of COPD.  I entered a DME order for home oxygen at 2 lpm because the diagnosis and testing require the patient to have supplemental oxygen.  Condition will improve or be benefited by oxygen use.  The patient is  able to perform good mobility in a home setting and therefore does require the use of a portable oxygen system.  The need for this equipment was discussed with the patient and he understands and is in agreement.    DAVE Kunz - CNP  Pulmonary Critical Care and Sleep Medicine,  [facility]  Office: [phone]  
Physical Therapy  Facility/Department: [facility]  Physical Therapy Initial Assessment    Name: Aniceto Elam  : 1950  MRN: [id number]  Date of Service: 2024    Discharge Recommendations:  Patient would benefit from continued therapy after discharge    Pt currently functioning below baseline.  Recommend daily inpatient skilled therapy at time of discharge to maximize long term outcomes and prevent re-admission. Please refer to AM-PAC score for current level of function.      PER HPI: Aniceto Elam is a 74 y.o. male who presents to the emergency department with a complaint of dyspnea.  Patient states symptoms have been ongoing for 3 weeks.  He states has been worse in the last week.  He came in via ambulance from [facility].  When EMS arrived his oxygen saturation was 88% on room air.  Patient has a history of pulmonary embolus and COPD.  He denies any chest pain.  He states he has a cough which is productive of yellow sputum.  He denies any fever, or chills.  Denies any lower extremity edema.  He has a history of atrial fibrillation and is on Eliquis.  Patient was recently discharged to [facility] from a [facility] facility for  ULTRASOUND GUIDED ACCESS OF LEFT COMMON FEMORAL ARTERY/ CUTDOWN OF RIGHT COMMON FEMORAL ARTERY/ ENDOVASCULAR ARTHRECTOMY OF RIGHT ILIAC ARTERY/ EVAR/ IVUS/ STENT PLACEMENT OF RIGHT ILIAC/ THROMBECTOMY/ RIGHT FEMORAL ENDARTERECTOMY WITH PATCH ANGIOPLASTY/ BALLOON ANGIOPLASTY/ ENDOANCHORS         Patient Diagnosis(es): The primary encounter diagnosis was Nonsustained ventricular tachycardia (HCC). Diagnoses of Congestive heart failure, unspecified HF chronicity, unspecified heart failure type (HCC), Hypoxia, and Shortness of breath were also pertinent to this visit.  Past Medical History:  has a past medical history of AAA (abdominal aortic aneurysm) (HCC), COPD (chronic obstructive pulmonary disease) (HCC), DVT (deep venous thrombosis) (HCC), MI, old, and PVD (peripheral vascular 
Physical Therapy  Facility/Department: [facility]  Rehabilitation Physical Therapy Treatment Note    NAME: Aniceto Elam  : 1950 (74 y.o.)  MRN: [id number]  CODE STATUS: DNR-CCA    Date of Service: 24       Restrictions:  Restrictions/Precautions: Up as Tolerated, General Precautions, Fall Risk  Position Activity Restriction  Other position/activity restrictions: 2L O2, telemetry, IV     SUBJECTIVE  Subjective  Subjective: pt complains of back pain states he just got back to bed from chair which was aggravating his back               OBJECTIVE  Cognition  Overall Cognitive Status: Exceptions  Arousal/Alertness: Appropriate responses to stimuli  Following Commands: Follows one step commands consistently  Attention Span: Attends with cues to redirect  Memory: Decreased recall of recent events  Safety Judgement: Decreased awareness of need for assistance;Decreased awareness of need for safety  Problem Solving: Decreased awareness of errors;Assistance required to identify errors made;Assistance required to correct errors made  Insights: Decreased awareness of deficits  Initiation: Requires cues for some  Sequencing: Requires cues for some  Cognition Comment: Impulsive  Orientation  Overall Orientation Status: Impaired  Orientation Level: Oriented to person;Oriented to place;Disoriented to time;Oriented to situation    Functional Mobility  Bed Mobility  Overall Assistance Level: Stand By Assist  Roll Left  Assistance Level: Stand by assist  Roll Right  Assistance Level: Stand by assist  Sit to Supine  Assistance Level: Stand by assist  Supine to Sit  Assistance Level: Stand by assist  Scooting  Assistance Level: Stand by assist      Pt able to sit and scoot up to head of bed with SBA                 PT Exercises  Exercise Treatment: seated at edge of bed LE AROM x 20 reps  Issued pt HEP for seated LE AROM     ASSESSMENT/PROGRESS TOWARDS GOALS  Vital Signs  BP Location: Right upper arm  O2 Device: Nasal 
Physical Therapy  Facility/Department: [facility]  Rehabilitation Physical Therapy Treatment Note    NAME: Aniceto Elam  : 1950 (74 y.o.)  MRN: [id number]  CODE STATUS: Prior    Date of Service: 24       Restrictions:  Restrictions/Precautions: Up as Tolerated, General Precautions, Fall Risk  Position Activity Restriction  Other position/activity restrictions: 2L O2, telemetry, IV     SUBJECTIVE  Pt up in chair requesting to go back to bed due to back pain                 OBJECTIVE  Cognition  Overall Cognitive Status: Exceptions  Arousal/Alertness: Appropriate responses to stimuli  Following Commands: Follows one step commands consistently  Attention Span: Attends with cues to redirect  Memory: Decreased recall of recent events  Safety Judgement: Decreased awareness of need for assistance;Decreased awareness of need for safety  Problem Solving: Decreased awareness of errors;Assistance required to identify errors made;Assistance required to correct errors made  Insights: Decreased awareness of deficits  Initiation: Requires cues for some  Sequencing: Requires cues for some  Cognition Comment: Impulsive  Orientation  Overall Orientation Status: Impaired  Orientation Level: Oriented to person;Oriented to place;Disoriented to time;Oriented to situation    Functional Mobility  Bed Mobility  Overall Assistance Level: Stand By Assist  Roll Left  Assistance Level: Stand by assist  Roll Right  Assistance Level: Stand by assist  Sit to Supine  Assistance Level: Stand by assist  Supine to Sit  Assistance Level: Stand by assist  Scooting  Assistance Level: Stand by assist  Transfers  Surface: To bed;From chair with arms  Additional Factors: Verbal cues;Hand placement cues  Device: Walker  Sit to Stand  Assistance Level: Minimal assistance  Stand to Sit  Assistance Level: Contact guard assist  Bed To/From Chair  Technique: Stand step;Stand pivot  Assistance Level: Contact guard assist      Environmental 
Pulmonary Critical Care Progress Note       Patient seen for the follow up of ICU management, respiratory insufficiency, VT (ventricular tachycardia) (HCC)     Subjective:  Patient sitting at bedside working with occupational therapy.  He denies chest pain. Mild occasional cough, mostly dry. Shortness of breath not much changed.     Examination:  Vitals: BP (!) 141/68   Pulse 87   Temp 96.9 °F (36.1 °C) (Temporal)   Resp 20   Ht 1.905 m (6' 3\")   Wt 84.5 kg (186 lb 4.8 oz)   SpO2 95%   BMI 23.29 kg/m²   General appearance: In no acute distress, alert and cooperative with exam  Neck: No JVD  Lungs: Moderate air exchange, bilateral rhonchi  Heart: regular rate and rhythm, S1, S2 normal, no gallop  Abdomen: Soft, non tender, + BS  Extremities: no cyanosis or clubbing. No significant edema    LABs:  CBC:   Recent Labs     05/06/24  0327 05/07/24  0259 05/08/24  0604   WBC 8.8 12.3* 10.5   HGB 13.5 13.2 14.1   HCT 42.0 40.8 43.8    188 205       BMP:   Recent Labs     05/06/24  0327 05/07/24  0259 05/08/24  0604    134* 136   K 4.0 3.8 4.3   CO2 27 29 33*   BUN 21 26* 21   CREATININE 0.6* 0.7 0.7   LABGLOM >90 >90 >90   GLUCOSE 151* 236* 119*       LIVER PROFILE:  No results for input(s): \"AST\", \"ALT\", \"LABALBU\" in the last 72 hours.    ABG:  Lab Results   Component Value Date/Time    FIO2 3.5 05/04/2024 08:26 PM       Lab Results   Component Value Date/Time    POCPH 7.448 05/04/2024 08:26 PM    POCPCO2 38.7 05/04/2024 08:26 PM    POCPO2 61.8 05/04/2024 08:26 PM    POCHCO3 26.8 05/04/2024 08:26 PM    PBEA 2.6 05/04/2024 08:26 PM    AWLI6XUM 92.3 05/04/2024 08:26 PM    FIO2 3.5 05/04/2024 08:26 PM     Radiology:  X-ray chest 5/4/24      Impression & Recommendations:  Acute hypoxic respiratory insufficiency  Oxygen by nasal cannula  Incentive spirometer q.1h awake  Home O2 eval before discharge     COPD exacerbation /right lower lobe pneumonia/ atelectasis  Xopenex by nebulizer  Make solu-medrol 40mg 
Pulmonary Critical Care Progress Note       Patient seen for the follow up of ICU management, respiratory insufficiency, VT (ventricular tachycardia) (HCC)     Subjective:  Patient sitting at bedside working with occupational therapy.  He denies chest pain. Mild occasional cough, mostly dry. Shortness of breath not much changed.     Examination:  Vitals: BP (!) 142/82   Pulse 74   Temp 97.2 °F (36.2 °C)   Resp 16   Ht 1.905 m (6' 3\")   Wt 84.5 kg (186 lb 4.8 oz)   SpO2 98%   BMI 23.29 kg/m²   General appearance: In no acute distress, alert and cooperative with exam  Neck: No JVD  Lungs: Moderate air exchange, bilateral rhonchi  Heart: regular rate and rhythm, S1, S2 normal, no gallop  Abdomen: Soft, non tender, + BS  Extremities: no cyanosis or clubbing. No significant edema    LABs:  CBC:   Recent Labs     05/04/24  1455 05/05/24  0559 05/06/24  0327 05/07/24  0259   WBC 11.9* 8.0 8.8 12.3*   HGB 14.0 13.4 13.5 13.2   HCT 41.7 42.3 42.0 40.8    184 185 188       BMP:   Recent Labs     05/04/24  1502 05/05/24  0559 05/06/24  0327 05/07/24  0259   NA  --  134* 135 134*   K  --  4.3 4.0 3.8   CO2  --  27 27 29   BUN  --  15 21 26*   CREATININE 0.6 0.7 0.6* 0.7   LABGLOM  --  >90 >90 >90   GLUCOSE  --  129* 151* 236*       LIVER PROFILE:  Recent Labs     05/04/24  1455   AST 17   ALT 15       ABG:  Lab Results   Component Value Date/Time    FIO2 3.5 05/04/2024 08:26 PM       Lab Results   Component Value Date/Time    POCPH 7.448 05/04/2024 08:26 PM    POCPCO2 38.7 05/04/2024 08:26 PM    POCPO2 61.8 05/04/2024 08:26 PM    POCHCO3 26.8 05/04/2024 08:26 PM    PBEA 2.6 05/04/2024 08:26 PM    [id number] 92.3 05/04/2024 08:26 PM    FIO2 3.5 05/04/2024 08:26 PM     Radiology:  X-ray chest 5/4/24      Impression & Recommendations:  Acute hypoxic respiratory insufficiency  Oxygen by nasal cannula  Incentive spirometer q.1h awake  Home O2 eval before discharge     COPD exacerbation /right lower lobe pneumonia/ 
Pulmonary Critical Care Progress Note       Patient seen for the follow up of ICU management, respiratory insufficiency, VT (ventricular tachycardia) (HCC)     Subjective:  Patient sitting at bedside working with occupational therapy.  He denies chest pain. Mild occasional cough, mostly dry. Shortness of breath not much changed.     Examination:  Vitals: BP 97/67   Pulse 84   Temp (!) 96.7 °F (35.9 °C) (Temporal)   Resp 25   Ht 1.905 m (6' 3\")   Wt 81.7 kg (180 lb 1.6 oz)   SpO2 97%   BMI 22.51 kg/m²   General appearance: In no acute distress, alert and cooperative with exam  Neck: No JVD  Lungs: Moderate air exchange, bilateral rhonchi  Heart: regular rate and rhythm, S1, S2 normal, no gallop  Abdomen: Soft, non tender, + BS  Extremities: no cyanosis or clubbing. No significant edema    LABs:  CBC:   Recent Labs     05/04/24  1455 05/05/24  0559 05/06/24  0327   WBC 11.9* 8.0 8.8   HGB 14.0 13.4 13.5   HCT 41.7 42.3 42.0    184 185     BMP:   Recent Labs     05/04/24  1502 05/05/24  0559 05/06/24  0327   NA  --  134* 135   K  --  4.3 4.0   CO2  --  27 27   BUN  --  15 21   CREATININE 0.6 0.7 0.6*   LABGLOM  --  >90 >90   GLUCOSE  --  129* 151*     LIVER PROFILE:  Recent Labs     05/04/24  1455   AST 17   ALT 15     ABG:  Lab Results   Component Value Date/Time    FIO2 3.5 05/04/2024 08:26 PM       Lab Results   Component Value Date/Time    POCPH 7.448 05/04/2024 08:26 PM    POCPCO2 38.7 05/04/2024 08:26 PM    POCPO2 61.8 05/04/2024 08:26 PM    POCHCO3 26.8 05/04/2024 08:26 PM    PBEA 2.6 05/04/2024 08:26 PM    [id number] 92.3 05/04/2024 08:26 PM    FIO2 3.5 05/04/2024 08:26 PM     Radiology:  X-ray chest 5/4/24      Impression & Recommendations:  Acute hypoxic respiratory insufficiency  Oxygen by nasal cannula  Incentive spirometer q.1h awake  Home O2 eval before discharge     COPD exacerbation /right lower lobe pneumonia/ atelectasis  Xopenex by nebulizer  Keep Solu-Medrol 40 IV q.8 hours  Rocephin 
SPIRITUAL CARE DEPARTMENT [facility]  PROGRESS NOTE    Room # 2040/2040-01   Name: Aniceto Elam            Rastafarian: Unknown     Reason for visit:  Rounding    I visited the patient.    Admit Date & Time: 5/4/2024  2:39 PM    Assessment:  Aniceto Elam is a 74 y.o. male in the hospital because VT (ventricular tachycardia) (HCC). Upon entering the room the patient was laying in bed.      Intervention:  I introduced myself and my title as  I offered space for the patient  to express feelings, needs, and concerns and provided a ministry presence. The patient was not feeling well so the  did not want to keep the patient too long in conversation. The blanket was lifting off of the patient, and so the  asked if she could get him another blanket in which he declined. The patient did receive prayer, and a prayer card as the  left with encouraging words.    Outcome:  The patient was cordial in thanking the  for the visit.    Plan:  Chaplains will remain available to offer spiritual and emotional support as needed.    Electronically signed by Chaplain Alexei, on 5/6/2024 at 7:17 PM.  Spiritual Care Department  [facility]     
MD Linda   albuterol sulfate HFA (VENTOLIN HFA) 108 (90 Base) MCG/ACT inhaler Inhale 2 puffs into the lungs every 6 hours as needed for Wheezing   Yes ProviderLinda MD   clopidogrel (PLAVIX) 75 MG tablet Take 1 tablet by mouth daily   Yes ProviderLinda MD   HYDROcodone-acetaminophen (NORCO) 5-325 MG per tablet Take 1 tablet by mouth every 6 hours as needed for Pain.    Linda Naik MD   cilostazol (PLETAL) 50 MG tablet Take 1 tablet by mouth 2 times daily 5/30/15   Linda Naik MD   atorvastatin (LIPITOR) 40 MG tablet Take 1 tablet by mouth daily 8/3/15   Dimitrios Caldwell MD   aspirin (ASPIRIN CHILDRENS) 81 MG chewable tablet Take 1 tablet by mouth daily 8/3/15   Javi, Dimitrios KRUSE MD        Allergies:       Adhesive tape and Cleocin [clindamycin]    Social History:     Tobacco:    reports that he has been smoking cigarettes. He does not have any smokeless tobacco history on file.  Alcohol:      has no history on file for alcohol use.  Drug Use:  has no history on file for drug use.    Family History:     Family History   Problem Relation Age of Onset    Diabetes Mother     Cancer Father     Stroke Brother     Coronary Art Dis Brother          Physical Exam:     Vitals:  /68   Pulse 78   Temp 97.5 °F (36.4 °C) (Temporal)   Resp 16   Ht 1.905 m (6' 3\")   Wt 81.7 kg (180 lb 1.6 oz)   SpO2 92%   BMI 22.51 kg/m²   Temp (24hrs), Av.1 °F (36.2 °C), Min:96.7 °F (35.9 °C), Max:97.6 °F (36.4 °C)          General appearance - alert, well appearing, and in no acute distress  Mental status - oriented to person, place, and time with normal affect  Head - normocephalic and atraumatic  Eyes - pupils equal and reactive, extraocular eye movements intact, conjunctiva clear  Ears - hearing appears to be intact  Nose - no drainage noted  Mouth - mucous membranes moist  Neck - supple, no carotid bruits, thyroid not palpable  Chest - clear to auscultation, normal effort  Heart - 
sitting up in chair and is ed on benefits of OOB activity as well as pursed lip breathing to assist with minor SOB.        Bed mobility  Supine to Sit: Contact guard assistance;Stand by assistance  Bed Mobility Comments: up to chair at end of session    Transfers  Sit to stand: Minimal assistance  Stand to sit: Minimal assistance  Transfer Comments: pt with poor follow through on cues to push up from bed to stand. Attempting to pull up on RW with hands.    Vision  Vision: Within Functional Limits (states he needs glasses for reading. Had cataract surgery)  Hearing  Hearing: Within functional limits  Cognition  Overall Cognitive Status: Exceptions  Arousal/Alertness: Appropriate responses to stimuli  Following Commands: Follows one step commands consistently  Attention Span: Attends with cues to redirect  Memory: Decreased recall of recent events  Safety Judgement: Decreased awareness of need for assistance;Decreased awareness of need for safety  Problem Solving: Decreased awareness of errors;Assistance required to identify errors made;Assistance required to correct errors made  Insights: Decreased awareness of deficits  Initiation: Requires cues for some  Sequencing: Requires cues for some  Cognition Comment: Impulsive  Orientation  Overall Orientation Status: Impaired  Orientation Level: Oriented to person;Oriented to place;Disoriented to time;Oriented to situation (stated \"I have no idea\" when asked about the date)  Perception  Overall Perceptual Status: WFL               Education Given To: Patient  Education Provided: Role of Therapy;Plan of Care;Home Exercise Program;Precautions;Transfer Training;ADL Adaptive Strategies;Equipment;Energy Conservation;Fall Prevention Strategies  Education Provided Comments: use of call light, breathing techs, RW safety, benefits of being up OOB to prevent further sedentary complications  Education Method: Demonstration;Verbal  Barriers to Learning: Cognition  Education Outcome: